# Patient Record
Sex: MALE | Race: WHITE | URBAN - METROPOLITAN AREA
[De-identification: names, ages, dates, MRNs, and addresses within clinical notes are randomized per-mention and may not be internally consistent; named-entity substitution may affect disease eponyms.]

---

## 2017-11-03 ENCOUNTER — OFFICE VISIT (OUTPATIENT)
Dept: ORTHOPEDIC SURGERY | Age: 52
End: 2017-11-03

## 2017-11-03 VITALS
DIASTOLIC BLOOD PRESSURE: 83 MMHG | HEART RATE: 78 BPM | WEIGHT: 195 LBS | HEIGHT: 70 IN | SYSTOLIC BLOOD PRESSURE: 128 MMHG | BODY MASS INDEX: 27.92 KG/M2

## 2017-11-03 DIAGNOSIS — S46.011A TRAUMATIC COMPLETE TEAR OF RIGHT ROTATOR CUFF, INITIAL ENCOUNTER: ICD-10-CM

## 2017-11-03 DIAGNOSIS — M25.511 ACUTE PAIN OF RIGHT SHOULDER: Primary | ICD-10-CM

## 2017-11-03 PROCEDURE — 99203 OFFICE O/P NEW LOW 30 MIN: CPT | Performed by: ORTHOPAEDIC SURGERY

## 2017-11-03 RX ORDER — ATORVASTATIN CALCIUM 20 MG/1
TABLET, FILM COATED ORAL
Refills: 11 | COMMUNITY
Start: 2017-08-21 | End: 2018-01-02

## 2017-11-03 ASSESSMENT — ENCOUNTER SYMPTOMS
GASTROINTESTINAL NEGATIVE: 1
BACK PAIN: 0
RESPIRATORY NEGATIVE: 1
EYES NEGATIVE: 1
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
## 2017-11-06 ENCOUNTER — OFFICE VISIT (OUTPATIENT)
Dept: ORTHOPEDIC SURGERY | Age: 52
End: 2017-11-06

## 2017-11-06 VITALS
RESPIRATION RATE: 12 BRPM | HEART RATE: 63 BPM | BODY MASS INDEX: 27.92 KG/M2 | DIASTOLIC BLOOD PRESSURE: 84 MMHG | HEIGHT: 70 IN | SYSTOLIC BLOOD PRESSURE: 129 MMHG | WEIGHT: 195 LBS

## 2017-11-06 DIAGNOSIS — S46.011A TRAUMATIC COMPLETE TEAR OF RIGHT ROTATOR CUFF, INITIAL ENCOUNTER: ICD-10-CM

## 2017-11-06 DIAGNOSIS — M25.511 ACUTE PAIN OF RIGHT SHOULDER: ICD-10-CM

## 2017-11-06 DIAGNOSIS — M75.21 BICEPS TENDINITIS OF RIGHT SHOULDER: Primary | ICD-10-CM

## 2017-11-06 PROCEDURE — L3670 SO ACRO/CLAV CAN WEB PRE OTS: HCPCS | Performed by: ORTHOPAEDIC SURGERY

## 2017-11-06 PROCEDURE — 99213 OFFICE O/P EST LOW 20 MIN: CPT | Performed by: ORTHOPAEDIC SURGERY

## 2017-11-06 NOTE — PROGRESS NOTES
Arleen Anne returns today to follow-up his right shoulder. He is feeling worse. He is significant anterior pain. He feels very limited and cannot move his right arm. He is very frustrated. Patient's medications, allergies, past medical, surgical, social and family histories were reviewed and updated as appropriate. Relevant review of systems reviewed. General Exam:    Vitals: Blood pressure 129/84, pulse 63, resp. rate 12, height 5' 10\" (1.778 m), weight 195 lb (88.5 kg). Constitutional: Patient is adequately groomed with no evidence of malnutrition  Mental Status: The patient is oriented to time, place and person. The patient's mood and affect are appropriate. Left shoulder exam remains benign. There is no tenderness over the biceps. He has no a.c. joint pain. He has full strength and normal motion. Right shoulder exam demonstrates significant inflammation and tenderness over the long head of the biceps. He has no a.c. joint pain. Strength in abduction is no better than 3/5. External rotation strength is 4/5. Internal rotation is 5 over 5. Neurologic and vascular exams of right upper extremity are normal.    MRI of the right shoulder is reviewed. It demonstrates:  1. High-grade partial-thickness interstitial and articular surface tear involves the mid to    distal supraspinatus and less so infraspinatus and minimally the distal subscapularis. A 6mm    region of full-thickness perforation is suggested at the anterodistal supraspinatus. Bursitis    is contributory. 2. AC arthrosis with undersurface spurring and type 2 acromion does result in lateral and    medial arch narrowing. Pattern predisposes to outlet-related cuff impingement. 3. Mild arcuate segment biceps long head tendinopathy. Tendon is perched on a hypertrophied    lesser tuberosity. Traction-related bony hypertrophy involves the lesser tuberosity. No jane    tear of the biceps or dislocation at this time.     4.

## 2017-11-09 ENCOUNTER — TELEPHONE (OUTPATIENT)
Dept: ORTHOPEDIC SURGERY | Age: 52
End: 2017-11-09

## 2017-11-16 ENCOUNTER — PAT TELEPHONE (OUTPATIENT)
Dept: PREADMISSION TESTING | Age: 52
End: 2017-11-16

## 2017-11-16 ENCOUNTER — TELEPHONE (OUTPATIENT)
Dept: ORTHOPEDIC SURGERY | Age: 52
End: 2017-11-16

## 2017-11-16 VITALS — HEIGHT: 70 IN | BODY MASS INDEX: 27.92 KG/M2 | WEIGHT: 195 LBS

## 2017-11-16 ASSESSMENT — PAIN - FUNCTIONAL ASSESSMENT: PAIN_FUNCTIONAL_ASSESSMENT: 0-10

## 2017-11-16 ASSESSMENT — PAIN SCALES - GENERAL: PAINLEVEL_OUTOF10: 3

## 2017-11-16 NOTE — TELEPHONE ENCOUNTER
Patient called to ask about taking Tylenol. Notified patient that he is ok to take Tylenol and can ice 20 minutes every hour PRN pain. Patient stated that he did not receive my voicemail from 12:52 today. Verified patient phone number on file is correct. Patient will check his voice mail messages.

## 2017-11-16 NOTE — PRE-PROCEDURE INSTRUCTIONS
on the day of surgery. All jewelry must be removed. If you have dentures, they will be removed before going to operating room. For your convenience, we will provide you with a container. If you wear contact lenses or glasses, they will be removed, please bring a case for them. If you have a living will and a durable power of  for healthcare, please bring in a copy. As part of our patient safety program to minimize surgical site infections, we ask you to do the following:    · Please notify your surgeon if you develop any illness between         now and the  day of your surgery. · This includes a cough, cold, fever, sore throat, nausea,         or vomiting, and diarrhea, etc.  ·  Please notify your surgeon if you experience dizziness, shortness         of breath or blurred vision between now and the time of your surgery. Do not shave your operative site 96 hours prior to surgery. For face and neck surgery, men may use an electric razor 48 hours   prior to surgery. You may shower the night before surgery or the morning of   your surgery with an antibacterial soap. You will need to bring a photo ID and insurance card    [facility] has an onsite pharmacy, would you like to utilize our pharmacy     If you will be staying overnight and use a C-pap machine, please bring   your C-pap to hospital     Our goal is to provide you with excellent care, therefore, visitors will be limited to two(2) in the room at a time so that we may focus on providing this care for you. Please contact pre-admission testing if you have any further questions. [facility] phone number:  [phone] PAT fax number:  [phone]  Please note these are generalized instructions for all surgical cases, you may be provided with more specific instructions according to your surgery.

## 2017-11-17 ENCOUNTER — SURG/PROC ORDERS (OUTPATIENT)
Dept: ANESTHESIOLOGY | Age: 52
End: 2017-11-17

## 2017-11-17 RX ORDER — SODIUM CHLORIDE 0.9 % (FLUSH) 0.9 %
10 SYRINGE (ML) INJECTION PRN
Status: CANCELLED | OUTPATIENT
Start: 2017-11-17

## 2017-11-17 RX ORDER — OXYCODONE HYDROCHLORIDE AND ACETAMINOPHEN 5; 325 MG/1; MG/1
1 TABLET ORAL EVERY 6 HOURS PRN
Qty: 28 TABLET | Refills: 0 | Status: SHIPPED | OUTPATIENT
Start: 2017-11-17 | End: 2018-01-02

## 2017-11-17 RX ORDER — DOCUSATE SODIUM 100 MG/1
100 CAPSULE, LIQUID FILLED ORAL 2 TIMES DAILY
Qty: 60 CAPSULE | Refills: 0 | Status: SHIPPED | OUTPATIENT
Start: 2017-11-17 | End: 2018-01-02

## 2017-11-17 RX ORDER — SODIUM CHLORIDE 0.9 % (FLUSH) 0.9 %
10 SYRINGE (ML) INJECTION EVERY 12 HOURS SCHEDULED
Status: CANCELLED | OUTPATIENT
Start: 2017-11-17

## 2017-11-17 RX ORDER — SODIUM CHLORIDE 9 MG/ML
INJECTION, SOLUTION INTRAVENOUS CONTINUOUS
Status: CANCELLED | OUTPATIENT
Start: 2017-11-17

## 2017-11-17 RX ORDER — PROMETHAZINE HYDROCHLORIDE 25 MG/1
25 TABLET ORAL EVERY 6 HOURS PRN
Qty: 30 TABLET | Refills: 0 | Status: SHIPPED | OUTPATIENT
Start: 2017-11-17 | End: 2017-11-24

## 2017-11-20 ENCOUNTER — HOSPITAL ENCOUNTER (OUTPATIENT)
Dept: SURGERY | Age: 52
Discharge: HOME | End: 2017-11-20
Attending: ORTHOPAEDIC SURGERY | Admitting: ORTHOPAEDIC SURGERY

## 2017-11-20 VITALS
WEIGHT: 195 LBS | DIASTOLIC BLOOD PRESSURE: 79 MMHG | HEART RATE: 92 BPM | RESPIRATION RATE: 16 BRPM | OXYGEN SATURATION: 93 % | HEIGHT: 70 IN | BODY MASS INDEX: 27.92 KG/M2 | SYSTOLIC BLOOD PRESSURE: 117 MMHG | TEMPERATURE: 97.4 F

## 2017-11-20 RX ORDER — SODIUM CHLORIDE 9 MG/ML
INJECTION, SOLUTION INTRAVENOUS CONTINUOUS
Status: DISCONTINUED | OUTPATIENT
Start: 2017-11-20 | End: 2017-11-21 | Stop reason: HOSPADM

## 2017-11-20 RX ORDER — SODIUM CHLORIDE 0.9 % (FLUSH) 0.9 %
10 SYRINGE (ML) INJECTION PRN
Status: DISCONTINUED | OUTPATIENT
Start: 2017-11-20 | End: 2017-11-21 | Stop reason: HOSPADM

## 2017-11-20 RX ORDER — FENTANYL CITRATE 50 UG/ML
25 INJECTION, SOLUTION INTRAMUSCULAR; INTRAVENOUS EVERY 5 MIN PRN
Status: DISCONTINUED | OUTPATIENT
Start: 2017-11-20 | End: 2017-11-21 | Stop reason: HOSPADM

## 2017-11-20 RX ORDER — OXYCODONE HYDROCHLORIDE AND ACETAMINOPHEN 5; 325 MG/1; MG/1
2 TABLET ORAL PRN
Status: ACTIVE | OUTPATIENT
Start: 2017-11-20 | End: 2017-11-20

## 2017-11-20 RX ORDER — OXYCODONE HYDROCHLORIDE AND ACETAMINOPHEN 5; 325 MG/1; MG/1
1 TABLET ORAL PRN
Status: ACTIVE | OUTPATIENT
Start: 2017-11-20 | End: 2017-11-20

## 2017-11-20 RX ORDER — APREPITANT 40 MG/1
40 CAPSULE ORAL ONCE
Status: COMPLETED | OUTPATIENT
Start: 2017-11-20 | End: 2017-11-20

## 2017-11-20 RX ORDER — SODIUM CHLORIDE 0.9 % (FLUSH) 0.9 %
10 SYRINGE (ML) INJECTION EVERY 12 HOURS SCHEDULED
Status: DISCONTINUED | OUTPATIENT
Start: 2017-11-20 | End: 2017-11-21 | Stop reason: HOSPADM

## 2017-11-20 RX ORDER — ONDANSETRON 2 MG/ML
4 INJECTION INTRAMUSCULAR; INTRAVENOUS
Status: COMPLETED | OUTPATIENT
Start: 2017-11-20 | End: 2017-11-20

## 2017-11-20 RX ADMIN — SODIUM CHLORIDE: 9 INJECTION, SOLUTION INTRAVENOUS at 06:45

## 2017-11-20 RX ADMIN — APREPITANT 40 MG: 40 CAPSULE ORAL at 07:00

## 2017-11-20 RX ADMIN — ONDANSETRON 4 MG: 2 INJECTION INTRAMUSCULAR; INTRAVENOUS at 10:05

## 2017-11-20 ASSESSMENT — PAIN DESCRIPTION - DESCRIPTORS: DESCRIPTORS: ACHING

## 2017-11-20 ASSESSMENT — LIFESTYLE VARIABLES: SMOKING_STATUS: 0

## 2017-11-20 ASSESSMENT — PAIN DESCRIPTION - PAIN TYPE: TYPE: PHANTOM PAIN

## 2017-11-20 ASSESSMENT — PAIN SCALES - GENERAL: PAINLEVEL_OUTOF10: 0

## 2017-11-20 ASSESSMENT — PAIN - FUNCTIONAL ASSESSMENT: PAIN_FUNCTIONAL_ASSESSMENT: 0-10

## 2017-11-20 ASSESSMENT — ENCOUNTER SYMPTOMS: SHORTNESS OF BREATH: 0

## 2017-11-20 NOTE — ANESTHESIA PRE-OP
[facility] Department of Anesthesiology  Pre-Anesthesia Evaluation/Consultation       Name:  Denny Brunner  : 1965  Age:  45 y. o. MRN:  [id number]  Date: 2017           Procedure (Scheduled):  Right shoulder scope, rotator cuff repair, biceps tenodesis  Surgeon:  Dr. Per Conroy      No Known Allergies  There is no problem list on file for this patient. Past Medical History:   Diagnosis Date    High cholesterol     PONV (postoperative nausea and vomiting)      Past Surgical History:   Procedure Laterality Date    EYE SURGERY Bilateral     LASIK    HERNIA REPAIR      lower abdominal area     Social History   Substance Use Topics    Smoking status: Never Smoker    Smokeless tobacco: Never Used    Alcohol use Yes      Comment: social     Medications  Current Outpatient Prescriptions on File Prior to Encounter   Medication Sig Dispense Refill    oxyCODONE-acetaminophen (PERCOCET) 5-325 MG per tablet Take 1 tablet by mouth every 6 hours as needed for Pain . 28 tablet 0    promethazine (PHENERGAN) 25 MG tablet Take 1 tablet by mouth every 6 hours as needed for Nausea 30 tablet 0    docusate sodium (COLACE) 100 MG capsule Take 1 capsule by mouth 2 times daily 60 capsule 0    atorvastatin (LIPITOR) 20 MG tablet TAKE 1 TABLET BY MOUTH ONE TIME A DAY  11    Cetirizine HCl (ZYRTEC PO) Take 10 mg by mouth daily       IBUPROFEN PO Take 600 mg by mouth daily as needed        No current facility-administered medications on file prior to encounter. Current Outpatient Prescriptions   Medication Sig Dispense Refill    oxyCODONE-acetaminophen (PERCOCET) 5-325 MG per tablet Take 1 tablet by mouth every 6 hours as needed for Pain .  28 tablet 0    promethazine (PHENERGAN) 25 MG tablet Take 1 tablet by mouth every 6 hours as needed for Nausea 30 tablet 0    docusate sodium (COLACE) 100 MG capsule Take 1 capsule by mouth 2 times daily 60 capsule 0    kg/m² as calculated from the following:    Height as of 11/16/17: 5' 10\" (1.778 m). Weight as of 11/16/17: 195 lb (88.5 kg). CBC No results found for: WBC, RBC, HGB, HCT, MCV, RDW, PLT  CMP  No results found for: NA, K, CL, CO2, BUN, CREATININE, GFRAA, AGRATIO, LABGLOM, GLUCOSE, PROT, CALCIUM, BILITOT, ALKPHOS, AST, ALT  BMP  No results found for: NA, K, CL, CO2, BUN, CREATININE, CALCIUM, GFRAA, LABGLOM, GLUCOSE  POCGlucose  No results for input(s): GLUCOSE in the last 72 hours. Coags  No results found for: PROTIME, INR, APTT  HCG (If Applicable) No results found for: PREGTESTUR, PREGSERUM, HCG, HCGQUANT   ABGs No results found for: PHART, PO2ART, FQD1UAB, VZL7RPK, BEART, G9OQJEEJ   Type & Screen (If Applicable)  No results found for: LABABO, LABRH                         BMI: Wt Readings from Last 3 Encounters:       NPO Status:     >8hrs                       Anesthesia Evaluation  Patient summary reviewed   history of anesthetic complications: PONV.   Airway: Mallampati: II  TM distance: >3 FB   Neck ROM: full  Mouth opening: > = 3 FB Dental:      Comment: No loose teeth    Pulmonary: breath sounds clear to auscultation      (-) COPD, asthma, shortness of breath, recent URI, sleep apnea (snores) and not a current smoker                           Cardiovascular:    (+) hyperlipidemia    (-) hypertension, valvular problems/murmurs, past MI, CAD, CABG/stent, dysrhythmias,  angina,  CHF, orthopnea and murmur      Rhythm: regular  Rate: normal           Beta Blocker:  Not on Beta Blocker         Neuro/Psych:      (-) seizures, neuromuscular disease, TIA, CVA, headaches and psychiatric history           GI/Hepatic/Renal:        (-) GERD, PUD, hepatitis, liver disease and bowel prep       Endo/Other:    (+) electrolyte abnormalities, .    (-) hypothyroidism, hyperthyroidism, blood dyscrasia, arthritis, no Type II DM, no Type I DM               Abdominal:           Vascular: Anesthesia Plan      general     ASA 2     (Right interscalene block. R/B/A discussed, shows understanding and wishes to proceed)  Induction: intravenous. MIPS: Prophylactic antiemetics administered. Anesthetic plan and risks discussed with patient and spouse. Plan discussed with CRNA. This pre-anesthesia assessment may be used as a history and physical.    DOS STAFF ADDENDUM:    Pt seen and examined, chart reviewed (including anesthesia, drug and allergy history). No interval changes to history and physical examination. Anesthetic plan, risks, benefits, alternatives, and personnel involved discussed with patient. Patient verbalized an understanding and agrees to proceed.       Nirali Benitez MD  November 20, 2017  6:46 AM

## 2017-11-20 NOTE — PROGRESS NOTES
Sleeping quietly in bed. SaO2 89-9% at times when asleep. Patient encouraged to cough and deep breathe. Patient repositioned up in bed. Right shoulder dressing dry and intact. Ice pack and sling in place. Patient denies C/O pain.

## 2017-11-20 NOTE — PROGRESS NOTES
Patient sleeping quietly. Arouses easily to name. VSS. IV patent. Patient denies C/O pain or nausea. Right shoulder dressing dry and intact with neurovascular checks stable to RUE.

## 2017-11-20 NOTE — H&P
Pt seen and examined, no interval changes, right shoulder marked, all questions answered, ready for surgery.

## 2017-11-20 NOTE — PROGRESS NOTES
Patient sleeping quietly. Arouses to name, drowsy. Patient denies C/O pain or nausea. VSS. IV patent. Right shoulder dressings dry and intact with stable neurovascular checks to RUE.

## 2017-11-20 NOTE — PROGRESS NOTES
Patient arouses to name, oral air way removed. VSS. IV patent. Resp easy unlabored. Right shoulder dressing unchanged. Neurovascular checks stable to RUE. Patient able to move fingers minimally. Patient C/O numbness to right arm.

## 2017-11-20 NOTE — PROGRESS NOTES
Patient C/O mild nausea and medicated with Zofran 4 mg IV per order. Family updated in [place] [facility] VSS. IV patent. Right shoulder dressings unchanged.

## 2017-11-21 ENCOUNTER — OFFICE VISIT (OUTPATIENT)
Dept: ORTHOPEDIC SURGERY | Age: 52
End: 2017-11-21

## 2017-11-21 ENCOUNTER — HOSPITAL ENCOUNTER (OUTPATIENT)
Dept: OTHER | Age: 52
Discharge: HOME | End: 2017-11-30
Attending: ORTHOPAEDIC SURGERY | Admitting: ORTHOPAEDIC SURGERY

## 2017-11-21 VITALS — BODY MASS INDEX: 27.92 KG/M2 | WEIGHT: 195 LBS | HEIGHT: 70 IN

## 2017-11-21 DIAGNOSIS — M75.21 BICEPS TENDINITIS OF RIGHT SHOULDER: Primary | ICD-10-CM

## 2017-11-21 DIAGNOSIS — S46.011D TRAUMATIC COMPLETE TEAR OF RIGHT ROTATOR CUFF, SUBSEQUENT ENCOUNTER: ICD-10-CM

## 2017-11-21 DIAGNOSIS — M25.511 ACUTE PAIN OF RIGHT SHOULDER: ICD-10-CM

## 2017-11-21 PROCEDURE — 99024 POSTOP FOLLOW-UP VISIT: CPT | Performed by: ORTHOPAEDIC SURGERY

## 2017-11-21 NOTE — PLAN OF CARE
[address], [address] [place], [address]  Phone: [phone]   Fax: [phone]          Physical Therapy Certification    Dear Referring Practitioner: Roxy Gibson,    We had the pleasure of evaluating the following patient for physical therapy services at [address] [place] [address]. A summary of our findings can be found in the initial assessment below. This includes our plan of care. If you have any questions or concerns regarding these findings, please do not hesitate to contact me at the office phone number checked above. Thank you for the referral.       Physician Signature:_______________________________Date:__________________  By signing above (or electronic signature), therapists plan is approved by physician      Patient: Samra Flood   : 1965   MRN: [id number]  Referring Physician: Referring Practitioner: David      Evaluation Date: 2017      Medical Diagnosis Information:  Diagnosis: B61.034 (ICD-10-CM) - Traumatic complete tear of right rotator cuff; M25.511 (ICD-10-CM) - Acute pain of right shoulder;  s/p right RCR with open biceps tenodesis. Surgery on 17   Treatment Diagnosis: M25.511 (ICD-10-CM) - Acute pain of right shoulder                                           Precautions/ Contra-indications: post op protocol  Latex Allergy:  [x]NO      []YES  Preferred Language for Healthcare:   [x]English       []other:    SUBJECTIVE: Patient stated complaint:  Pt fell around the last weekend of October out of a plastic lawn chair and landed on his shoulder on a golf cart. He knew something was wrong. He went to an ortho urgent care, but he did come see Dr. Roxy Gibson the Monday after the injury. Pt is RHD. Relevant Medical History:hernia surgery  Functional Disability Index:  UEFI    Pain Scale: 3-4/10  Easing factors: one pain pill at 9 PM last night and one at 0600 this Am.   He did eat this morning. Nerve block helped. Provocative factors:  NA surgery was yesterday    Type: []Constant   []Intermittent  []Radiating []Localized []other:     Numbness/Tingling: none    Functional Limitations/Impairments: [x]Lifting/reaching [x]Grooming [x]Carrying    [x]ADL's [x]Driving [x]Sports/Recreations   [x]Other:    Occupation/School: . He is able to do light duty work. He is planning on taking 2-3 weeks of work completely, and then returning to light duty. Living Status/Prior Level of Function: Independent with ADLs and IADLs, he is in a [employer] league (2x/wk), fishing, golfing, cars. (insert highest prior level of function)    OBJECTIVE:   Temp 97.8 F    CERV ROM     Cervical Flexion     Cervical Extension     Cervical SB     Cervical rotation         ROM PROM AROM  Comment    L R L R    Flexion   154 seated     Abduction   164 seated     ER        IR        Other        Other             Strength L R Comment   Flexion      Abduction      ER      IR      Supraspinatus      Upper Trap      Lower Trap      Mid Trap      Rhomboids      Biceps      Triceps      Horizontal Abduction      Horizontal Adduction      Lats        Special Tests Left Right   Apley Scratch IR:  ER:   Cross body: IR:  ER:  Cross Body:   Neer's     Full Can     Empty Can     Jemima Ochoa     Nerve Tension Testing     Speed's     Magdaleno's      Spurling's     Repeated Scaption                Reflexes/Sensation (myotomes/dermatomes): sensation grossly intact    Joint mobility:   []Normal    [x]Hypo   []Hyper    Palpation: NT secondary to post op    Functional Mobility/Transfers: limited secondary to post op    Posture: slight forward head    Bandages/Dressings/Incisions: pt's dressings were removed by PT. MD did replace steristrips. No S&S of infection were noted. Tegaderm was replaced over the new steristrips.        Gait:  [facility]    Orthopedic Special Tests: NT secondary to post op                       [x] Patient history, allergies, meds reviewed. Medical chart reviewed. See intake form. Review Of Systems (ROS):  [x]Performed Review of systems (Integumentary, CardioPulmonary, Neurological) by intake and observation. Intake form has been scanned into medical record. Patient has been instructed to contact their primary care physician regarding ROS issues if not already being addressed at this time. Co-morbidities/Complexities (which will affect course of rehabilitation):   [x]None           Arthritic conditions   []Rheumatoid arthritis (M05.9)  []Osteoarthritis (M19.91)   Cardiovascular conditions   []Hypertension (I10)  []Hyperlipidemia (E78.5)  []Angina pectoris (I20)  []Atherosclerosis (I70)   Musculoskeletal conditions   []Disc pathology   []Congenital spine pathologies   []Prior surgical intervention  []Osteoporosis (M81.8)  []Osteopenia (M85.8)   Endocrine conditions   []Hypothyroid (E03.9)  []Hyperthyroid Gastrointestinal conditions   []Constipation (N80.75)   Metabolic conditions   []Morbid obesity (E66.01)  []Diabetes type 1(E10.65) or 2 (E11.65)   []Neuropathy (G60.9)     Pulmonary conditions   []Asthma (J45)  []Coughing   []COPD (J44.9)   Psychological Disorders  []Anxiety (F41.9)  []Depression (F32.9)   []Other:   []Other:          Barriers to/and or personal factors that will affect rehab potential:              [x]Age  []Sex              []Motivation/Lack of Motivation                        []Co-Morbidities              []Cognitive Function, education/learning barriers              []Environmental, home barriers              []profession/work barriers  []past PT/medical experience  []other:  Justification:      Falls Risk Assessment (30 days):   [x] Falls Risk assessed and no intervention required.   [] Falls Risk assessed and Patient requires intervention due to being higher risk   TUG score (>12s at risk):     [] Falls education provided, including  Pt did fall but it was out of a chair, not with walking. G-Codes:  PT G-Codes  Functional Assessment Tool Used: UEFI  Score: 3.75%  Functional Limitation: Carrying, moving and handling objects  Carrying, Moving and Handling Objects Current Status (): At least 80 percent but less than 100 percent impaired, limited or restricted  Carrying, Moving and Handling Objects Goal Status (): 0 percent impaired, limited or restricted    ASSESSMENT:   Functional Impairments   [x]Noted spinal or UE joint hypomobility   []Noted spinal or UE joint hypermobility   []Decreased UE functional ROM   [x]Decreased UE functional strength   []Abnormal reflexes/sensation/myotomal/dermatomal deficits   [x]Decreased RC/scapular/core strength and neuromuscular control   []other:      Functional Activity Limitations (from functional questionnaire and intake)   [x]Reduced ability to tolerate prolonged functional positions   [x]Reduced ability or difficulty with changes of positions or transfers between positions   []Reduced ability to maintain good posture and demonstrate good body mechanics with sitting, bending, and lifting   [x] Reduced ability or tolerance with driving and/or computer work   [x]Reduced ability to sleep   [x]Reduced ability to perform lifting, reaching, carrying tasks   [x]Reduced ability to tolerate impact through UE   [x]Reduced ability to reach behind back   [x]Reduced ability to  or hold objects   [x]Reduced ability to throw or toss an object   []other:    Participation Restrictions   [x]Reduced participation in self care activities   [x]Reduced participation in home management activities   [x]Reduced participation in work activities   [x]Reduced participation in social activities. [x]Reduced participation in sport/recreation activities. Classification:   []Signs/symptoms consistent with post-surgical status including decreased ROM, strength and function.   []Signs/symptoms consistent with joint sprain/strain   []Signs/symptoms consistent with shoulder impingement   []Signs/symptoms consistent with shoulder/elbow/wrist tendinopathy   []Signs/symptoms consistent with Rotator cuff tear   []Signs/symptoms consistent with labral tear   []Signs/symptoms consistent with postural dysfunction    []Signs/symptoms consistent with Glenohumeral IR Deficit - <45 degrees   []Signs/symptoms consistent with facet dysfunction of cervical/thoracic spine    []Signs/symptoms consistent with pathology which may benefit from Dry needling     [x]other:  Pt is a 44 y/o male presenting with diagnosis of s/p right RCR and biceps tenodesis from the MD.  Clinically, the pt presents with decreased ROM, decreased strength, decreased function, and increased pain consistent with the MD diagnosis. The pt would benefit from skilled PT to return to [facility]. We did discuss that the pt does live close to the [place] office, but he will plan on attending PT here for the first 6 weeks. We discussed his protocol and what to expect with it. We did also discuss the [facility] program.    Prognosis/Rehab Potential:      []Excellent   [x]Good    []Fair   []Poor    Tolerance of evaluation/treatment:    []Excellent   [x]Good    []Fair   []Poor    PLAN:  Frequency/Duration:  1-2 days per week for 24 Weeks:  INTERVENTIONS:  [x] Therapeutic exercise including: strength training, ROM, for Upper extremity and core   [x]  NMR activation and proprioception for UE, scap and Core   [x] Manual therapy as indicated for shoulder, scapula and spine to include: Dry Needling/IASTM, STM, PROM, Gr I-IV mobilizations, manipulation. [x] Modalities as needed that may include: thermal agents, E-stim, Biofeedback, US, iontophoresis as indicated  [x] Patient education on joint protection, postural re-education, activity modification, progression of HEP. HEP instruction: (see scanned forms)    GOALS:  Patient stated goal: return to work and sports. Therapist goals for Patient:   Short Term Goals:  To be achieved in: 2 weeks  1. Pt will be independent in HEP and progression per patient tolerance, in order to prevent re-injury. 2. Patient will report pain at worst less than or equal to 4/10  to facilitate improvement in movement, function, and ADLs as indicated by dunctional seficits. Long Term Goals: To be achieved in: 24 weeks  1. Pt will demo a UEFI of greater than or equal to 85% to assist with reaching prior level of function. 2. Patient will demonstrate increased AROM shoulder flexion greater than or equal to 150, ABD greater than or equal to 160, IR greater than or equal 50, ER greater than or equal to 80 to allow for proper joint functioning as indicated by patient's functional deficits. 3. Patient will demonstrate an increase in strength to 4 to 4+ to allow for proper functional mobility as indicated by patient's functional deficits. 4. Patient will return to work without increased symptoms or restriction. 5. Pt will return to bowling without increasing pain or symptoms. 6.  Pt will report pain at worst less than or equal to 2/10.       Physical Therapy Evaluation Complexity Justification  [x] A history of present problem with:  [x] no personal factors and/or comorbidities that impact the plan of care;  []1-2 personal factors and/or comorbidities that impact the plan of care  []3 personal factors and/or comorbidities that impact the plan of care  [x] An examination of body systems using standardized tests and measures addressing any of the following: body structures and functions (impairments), activity limitations, and/or participation restrictions;:  [] a total of 1-2 or more elements   [] a total of 3 or more elements   [x] a total of 4 or more elements   [x] A clinical presentation with:  [x] stable and/or uncomplicated characteristics   [] evolving clinical presentation with changing characteristics  [] unstable and unpredictable characteristics;   [x] Clinical decision making of [x] low, [] moderate, [] high complexity using standardized patient assessment instrument and/or measurable assessment of functional outcome.     [x] EVAL (LOW) 02882 (typically 20 minutes face-to-face)  [] EVAL (MOD) 66333 (typically 30 minutes face-to-face)  [] EVAL (HIGH) 95415 (typically 45 minutes face-to-face)  [] Litzy Slicker        Electronically signed by:  Oneil Zapata, PT, DPT [id number]

## 2017-11-21 NOTE — FLOWSHEET NOTE
Orthopaedics and [address] [place] [address] DR JUAN CULP      Physical Therapy Daily Treatment Note  Date:  2017    Patient Name:  Sharita Christopher  \"Zia\"  :  1965  MRN: [id number]  Medical/Treatment Diagnosis Information:  · Diagnosis: O49.452 (ICD-10-CM) - Traumatic complete tear of right rotator cuff; M25.511 (ICD-10-CM) - Acute pain of right shoulder;  s/p right RCR with open biceps tenodesis. Surgery on 17  · Treatment Diagnosis: M25.511 (ICD-10-CM) - Acute pain of right shoulder  Insurance/Certification information:  PT Insurance Information: [place]  Physician Information:  Referring Practitioner: [facility] of care signed (Y/N):     Date of Patient follow up with Physician: 2017    G-Code (if applicable):      Date G-Code Applied:  2017  PT G-Codes  Functional Assessment Tool Used: UEFI  Score: 3.75%  Functional Limitation: Carrying, moving and handling objects  Carrying, Moving and Handling Objects Current Status (): At least 80 percent but less than 100 percent impaired, limited or restricted  Carrying, Moving and Handling Objects Goal Status (): 0 percent impaired, limited or restricted    Progress Note: [x]  Yes  []  No  Next due by: Visit #10  Or 19 Dec 2017    Latex Allergy:  [x]NO      []YES  Preferred Language for Healthcare:   [x]English       []other:    Visit # Insurance Allowable   1 20     Pain level:  See eval     SUBJECTIVE:  See eval    OBJECTIVE: See eval  Observation:   Test measurements:      ROM PROM AROM  Comment    L R L R    Flexion        Abduction        ER        IR        Other        Other             Strength L R Comment   Flexion      Abduction      ER      IR      Supraspinatus      Upper Trap      Lower Trap      Mid Trap      Rhomboids      Biceps      Triceps      Horizontal Abduction      Horizontal Adduction      Lats          RESTRICTIONS/PRECAUTIONS: NWB, no resisted biceps for 4 weeks, 6 weeks in sling.   PROM limited to 100 diagnosis. The pt would benefit from skilled PT to return to [facility]. We did discuss that the pt does live close to the [facility] office, but he will plan on attending PT here for the first 6 weeks. We discussed his protocol and what to expect with it.   We did also discuss the [facility] program.    Prognosis: [] Good [] Fair  [] Poor    Patient Requires Follow-up: [x] Yes  [] No    PLAN: See eval  [] Continue per plan of care [] Alter current plan (see comments)  [x] Plan of care initiated [] Hold pending MD visit [] Discharge    Electronically signed by: Gonzalo Gonzáles DPT [id number]

## 2017-11-28 ENCOUNTER — OFFICE VISIT (OUTPATIENT)
Dept: ORTHOPEDIC SURGERY | Age: 52
End: 2017-11-28

## 2017-11-28 ENCOUNTER — HOSPITAL ENCOUNTER (OUTPATIENT)
Dept: PHYSICAL THERAPY | Age: 52
Discharge: HOME | End: 2017-11-29
Admitting: ORTHOPAEDIC SURGERY

## 2017-11-28 VITALS — BODY MASS INDEX: 27.92 KG/M2 | WEIGHT: 195 LBS | HEIGHT: 70 IN

## 2017-11-28 DIAGNOSIS — M25.511 ACUTE PAIN OF RIGHT SHOULDER: ICD-10-CM

## 2017-11-28 DIAGNOSIS — M75.21 BICEPS TENDINITIS OF RIGHT SHOULDER: Primary | ICD-10-CM

## 2017-11-28 DIAGNOSIS — S46.011D TRAUMATIC COMPLETE TEAR OF RIGHT ROTATOR CUFF, SUBSEQUENT ENCOUNTER: ICD-10-CM

## 2017-11-28 PROCEDURE — 99024 POSTOP FOLLOW-UP VISIT: CPT | Performed by: ORTHOPAEDIC SURGERY

## 2017-12-01 ENCOUNTER — HOSPITAL ENCOUNTER (OUTPATIENT)
Dept: OTHER | Age: 52
Discharge: HOME | End: 2017-12-31
Attending: ORTHOPAEDIC SURGERY | Admitting: ORTHOPAEDIC SURGERY

## 2017-12-04 ENCOUNTER — TELEPHONE (OUTPATIENT)
Dept: ORTHOPEDIC SURGERY | Age: 52
End: 2017-12-04

## 2017-12-05 ENCOUNTER — HOSPITAL ENCOUNTER (OUTPATIENT)
Dept: PHYSICAL THERAPY | Age: 52
Discharge: HOME | End: 2017-12-06
Admitting: ORTHOPAEDIC SURGERY

## 2017-12-12 ENCOUNTER — HOSPITAL ENCOUNTER (OUTPATIENT)
Dept: PHYSICAL THERAPY | Age: 52
Discharge: HOME | End: 2017-12-13
Admitting: ORTHOPAEDIC SURGERY

## 2017-12-19 ENCOUNTER — HOSPITAL ENCOUNTER (OUTPATIENT)
Dept: PHYSICAL THERAPY | Age: 52
Discharge: HOME | End: 2017-12-20
Admitting: ORTHOPAEDIC SURGERY

## 2017-12-27 ENCOUNTER — HOSPITAL ENCOUNTER (OUTPATIENT)
Dept: PHYSICAL THERAPY | Age: 52
Discharge: HOME | End: 2017-12-28
Admitting: ORTHOPAEDIC SURGERY

## 2018-01-01 ENCOUNTER — HOSPITAL ENCOUNTER (OUTPATIENT)
Dept: OTHER | Age: 53
Discharge: HOME | End: 2018-01-31
Attending: ORTHOPAEDIC SURGERY | Admitting: ORTHOPAEDIC SURGERY

## 2018-01-02 ENCOUNTER — HOSPITAL ENCOUNTER (OUTPATIENT)
Dept: PHYSICAL THERAPY | Age: 53
Discharge: HOME | End: 2018-01-03
Admitting: ORTHOPAEDIC SURGERY

## 2018-01-02 ENCOUNTER — OFFICE VISIT (OUTPATIENT)
Dept: ORTHOPEDIC SURGERY | Age: 53
End: 2018-01-02

## 2018-01-02 VITALS — WEIGHT: 195 LBS | BODY MASS INDEX: 27.92 KG/M2 | HEIGHT: 70 IN

## 2018-01-02 DIAGNOSIS — M75.21 BICEPS TENDINITIS OF RIGHT SHOULDER: Primary | ICD-10-CM

## 2018-01-02 DIAGNOSIS — S46.011D TRAUMATIC COMPLETE TEAR OF RIGHT ROTATOR CUFF, SUBSEQUENT ENCOUNTER: ICD-10-CM

## 2018-01-02 PROCEDURE — 99024 POSTOP FOLLOW-UP VISIT: CPT | Performed by: ORTHOPAEDIC SURGERY

## 2018-01-02 RX ORDER — ATORVASTATIN CALCIUM 40 MG/1
40 TABLET, FILM COATED ORAL
COMMUNITY
Start: 2017-11-08

## 2018-01-02 NOTE — LETTER
[address] [place] [address]  Phone: [phone]  Fax: [phone]    Keila Doan MD  January 10, 2018     [address] [address]    Patient: Isabel Ramírez  MR Number: [id number]  YOB: 1965  Date of Visit: 1/2/2018    Dear Dr. Endy Grullon are the relevant portions of my assessment and plan of care. Isabel Ramírez returns today status post right shoulder arthroscopy with rotator cuff repair and biceps tenodesis. Surgery was November 20. He is doing well. Pain is at worst about 110. Overall he is pleased with his progress. He'll come out of the sling today. On exam today he has 4/5 strength in abduction and external rotation. He has tightness in elevation beyond 90°. He has tightness in external rotation beyond 30°. He has no tenderness. Neurologic and vascular exams the right upper extremity is normal.  Incisions are well-healed. Overall he looks good. He can discontinue the sling. He'll progress to full motion and therapy over the next 6 weeks. Follow-up with me in 6 weeks. This note was created using voice recognition software. It has been proofread, but occasionally errors remain. Please disregard these errors. They will be corrected as they are noted. If you have questions, please do not hesitate to call me. I look forward to following [facility] along with you.     Sincerely,    Keila Doan MD

## 2018-01-02 NOTE — FLOWSHEET NOTE
NWB, no resisted biceps for 4 weeks, 6 weeks in sling. PROM limited to 100 flexion and 60 ER for first 6 weeks. Exercises/Interventions:   Exercise/Equipment Resistance/Repetitions Other comments   Aerobic Conditioning     Aerodyne          Stretching/PROM     Wand 10x:10 ER supine At 0 and 45 ABD   Table Slides 10x:10 flex   Wall slides      UE [place]     Pulleys 10x:10 Flex/scap   Pendulum hangs 10x:10    BB IR 10x:10 cane   SL IR     Pec doorway stretch     CBA stretch     UT stretch LS stretch Elbow flex/ext     Wrist flex/ext               Isometrics     Retraction          Weight shift     Flexion     Abduction     External Rotation     Internal Rotation     Biceps     Triceps          PRE's     Flexion     Abduction     External Rotation     Internal Rotation     Shrugs 3x10 3#    EXT     Reverse Flys     Serratus     Horizontal Abd with ER     Biceps 3x10 2#    Triceps     Retraction     Wrist flex/ext               Cable Column/Theraband     External Rotation     Internal Rotation     Shrugs     Lats     Ext 3x10 red    Flex     Scapular Retraction 3x10 red    BIC     TRIC     PNF          Dynamic Stability          [place]              Therapeutic Exercise and NMR EXR  [x] (18079) Provided verbal/tactile cueing for activities related to strengthening, flexibility, endurance, ROM  for improvements in scapular, scapulothoracic and UE control with self care, reaching, carrying, lifting, house/yardwork, driving/computer work.    [] (12618) Provided verbal/tactile cueing for activities related to improving balance, coordination, kinesthetic sense, posture, motor skill, proprioception  to assist with  scapular, scapulothoracic and UE control with self care, reaching, carrying, lifting, house/yardwork, driving/computer work.     Therapeutic Activities:    [] (01639 or 75348) Provided verbal/tactile cueing for activities related to improving balance, coordination, kinesthetic sense, posture, motor skill, proprioception and motor activation to allow for proper function of scapular, scapulothoracic and UE control with self care, carrying, lifting, driving/computer work. Home Exercise Program:    [x] (72371) Reviewed/Progressed HEP activities related to strengthening, flexibility, endurance, ROM of scapular, scapulothoracic and UE control with self care, reaching, carrying, lifting, house/yardwork, driving/computer work  [] (18390) Reviewed/Progressed HEP activities related to improving balance, coordination, kinesthetic sense, posture, motor skill, proprioception of scapular, scapulothoracic and UE control with self care, reaching, carrying, lifting, house/yardwork, driving/computer work      Manual Treatments:  PROM / STM / Oscillations-Mobs:  G-I, II, III, IV (PA's, Inf., Post.)  [] (68902) Provided manual therapy to mobilize soft tissue/joints of cervical/CT, scapular GHJ and UE for the purpose of modulating pain, promoting relaxation,  increasing ROM, reducing/eliminating soft tissue swelling/inflammation/restriction, improving soft tissue extensibility and allowing for proper ROM for normal function with self care, reaching, carrying, lifting, house/yardwork, driving/computer work    Pt Education:     Modalities: 15' ice     Charges:   Timed Code Treatment Minutes: 40'   Total Treatment Minutes: 80'     [] EVAL (LOW) 66663   [] EVAL (MOD) 08195   [] EVAL (HIGH) 96272   [] RE-EVAL   [x] KL(85479) x  1   [] IONTO  [] NMR (90388) x      [] VASO  [] Manual (04058) x       [] Other:  [x] TA x  2    [] [facility] (23502)  [] ES(attended) (79099)      [] ES (un) (83521): Therapist goals for Patient:   Short Term Goals: To be achieved in: 2 weeks  1. Pt will be independent in HEP and progression per patient tolerance, in order to prevent re-injury. -met  2.  Patient will report pain at worst less than or equal to 4/10  to facilitate improvement in movement, function, and ADLs as indicated by dunctional

## 2018-01-02 NOTE — PROGRESS NOTES
Sunitha Chapman returns today status post right shoulder arthroscopy with rotator cuff repair and biceps tenodesis. Surgery was November 20. He is doing well. Pain is at worst about 110. Overall he is pleased with his progress. He'll come out of the sling today. On exam today he has 4/5 strength in abduction and external rotation. He has tightness in elevation beyond 90°. He has tightness in external rotation beyond 30°. He has no tenderness. Neurologic and vascular exams the right upper extremity is normal.  Incisions are well-healed. Overall he looks good. He can discontinue the sling. He'll progress to full motion and therapy over the next 6 weeks. Follow-up with me in 6 weeks. This note was created using voice recognition software. It has been proofread, but occasionally errors remain. Please disregard these errors. They will be corrected as they are noted.

## 2018-01-09 ENCOUNTER — HOSPITAL ENCOUNTER (OUTPATIENT)
Dept: PHYSICAL THERAPY | Age: 53
Discharge: HOME | End: 2018-01-10
Admitting: ORTHOPAEDIC SURGERY

## 2018-01-09 NOTE — FLOWSHEET NOTE
work.    Therapeutic Activities:    [] (02676 or 89939) Provided verbal/tactile cueing for activities related to improving balance, coordination, kinesthetic sense, posture, motor skill, proprioception and motor activation to allow for proper function of scapular, scapulothoracic and UE control with self care, carrying, lifting, driving/computer work. Home Exercise Program:    [x] (44466) Reviewed/Progressed HEP activities related to strengthening, flexibility, endurance, ROM of scapular, scapulothoracic and UE control with self care, reaching, carrying, lifting, house/yardwork, driving/computer work  [] (30673) Reviewed/Progressed HEP activities related to improving balance, coordination, kinesthetic sense, posture, motor skill, proprioception of scapular, scapulothoracic and UE control with self care, reaching, carrying, lifting, house/yardwork, driving/computer work      Manual Treatments:  PROM / STM / Oscillations-Mobs:  G-I, II, III, IV (PA's, Inf., Post.)  [x] (56306) Provided manual therapy to mobilize soft tissue/joints of cervical/CT, scapular GHJ and UE for the purpose of modulating pain, promoting relaxation,  increasing ROM, reducing/eliminating soft tissue swelling/inflammation/restriction, improving soft tissue extensibility and allowing for proper ROM for normal function with self care, reaching, carrying, lifting, house/yardwork, driving/computer work   15'- gentle flex, ABD, IR, ER    Pt Education:     Modalities: 15' ice     Charges:   Timed Code Treatment Minutes: 60'   Total Treatment Minutes: 76'     [] EVAL (LOW) 61721   [] EVAL (MOD) 83606   [] EVAL (HIGH) 29463   [] RE-EVAL   [x] UG(14921) x  1   [] IONTO  [] NMR (69759) x      [] VASO  [x] Manual (32521) x  1    [] Other:  [x] TA x  2    [] Mech Traction (40422)  [] ES(attended) (63994)      [] ES (un) (32849): Therapist goals for Patient:   Short Term Goals: To be achieved in: 2 weeks  1.  Pt will be independent in HEP and progressing motion.   [x] Continue per plan of care [] Alter current plan (see comments)  [] Plan of care initiated [] Hold pending MD visit [] Discharge    Electronically signed by: Сергей Villa DPT [id number]

## 2018-01-16 ENCOUNTER — HOSPITAL ENCOUNTER (OUTPATIENT)
Dept: PHYSICAL THERAPY | Age: 53
Discharge: HOME | End: 2018-01-17
Admitting: ORTHOPAEDIC SURGERY

## 2018-01-16 RX ORDER — METHYLPREDNISOLONE 4 MG/1
TABLET ORAL
Qty: 1 KIT | Refills: 0 | Status: SHIPPED | OUTPATIENT
Start: 2018-01-16 | End: 2018-04-26

## 2018-01-16 NOTE — PLAN OF CARE
- Traumatic complete tear of right rotator cuff; M25.511 (ICD-10-CM) - Acute pain of right shoulder;  s/p right RCR with open biceps tenodesis. Surgery on 11/20/17  · Treatment Diagnosis: M25.511 (ICD-10-CM) - Acute pain of right shoulder  Insurance/Certification information:  PT Insurance Information: [employer]  Physician Information:  Referring Practitioner: Vicente Payment of care signed (Y/N):     Date of Patient follow up with Physician: 13 Feb 2018    G-Code (if applicable):      Date G-Code Applied:  1/16/18   PT G-Codes  Functional Assessment Tool Used: UEFI  Score: 63.75%  Functional Limitation: Carrying, moving and handling objects  Carrying, Moving and Handling Objects Current Status (): At least 40 percent but less than 60 percent impaired, limited or restricted  Carrying, Moving and Handling Objects Goal Status (): 0 percent impaired, limited or restricted    Progress Note: [x]  Yes  []  No  Next due by: Visit #13  Or 14 Feb 2018    Latex Allergy:  [x]NO      []YES  Preferred Language for Healthcare:   [x]English       []other:    Visit # Insurance Allowable   3 20     Pain level:  2/10 1/16/2018      SUBJECTIVE: When he stretches after he stretches, he feels pretty good. He has been having difficulty sleeping. It woke him up around 0430. He doesn't think he hurt it. After his last tx, he was sore. He did his HEP 1x the next day. He felt better by Sunday. He felt a pop over the anterior shoulder with TB rows. He modified it. He feels he has been pulling it too high. He is concerned that he is still tight. He is hoping that his soreness is from pushing his stretches so much. The stretching does help alleviate the pain.       OBJECTIVE: 1/16/18   Observation:   Test measurements:      ROM PROM AROM  Comment    L R L R    Flexion  112 pulleys and manual      Abduction  124 manual scaption; 132 pulleys      ER  32 at 90 with cane; 36 with manual at 90        IR  38      Other Other             Strength L R Comment   Flexion   NT secondary to protocol   Abduction      ER      IR      Supraspinatus      Upper Trap      Lower Trap      Mid Trap      Rhomboids      Biceps      Triceps      Horizontal Abduction      Horizontal Adduction      Lats          RESTRICTIONS/PRECAUTIONS: NWB, no resisted biceps for 4 weeks, 6 weeks in sling. PROM limited to 100 flexion and 60 ER for first 6 weeks.       Exercises/Interventions:   Exercise/Equipment Resistance/Repetitions Other comments   Aerobic Conditioning     Aerodyne          Stretching/PROM     Wand 10x:10 ER supine At 0, 45, and 90 ABD  And flexion in supine  scaption standing   Table Slides 10x:10 Flex/scaption   Wall slides      UE [place]     Pulleys 10x:10 Flex/scap   Pendulum hangs     BB IR 10x:10 Cane and rope   SL IR     Pec doorway stretch     CBA stretch     UT stretch LS stretch Elbow flex/ext     Wrist flex/ext               Isometrics     Retraction          Weight shift     Flexion     Abduction     External Rotation     Internal Rotation     Biceps     Triceps          PRE's     Flexion     Abduction     External Rotation     Internal Rotation     Shrugs 3x10 6#    EXT     Reverse Flys     Serratus 3x10 with cane    Horizontal Abd with ER     Biceps 3x10 4#    Triceps     Retraction     Wrist flex/ext               Cable Column/Theraband     External Rotation     Internal Rotation     Shrugs     Lats     Ext 3x10 green    Flex     Scapular Retraction 3x10 green    BIC     TRIC     PNF          Dynamic Stability          Plyoback              Therapeutic Exercise and NMR EXR  [x] (82003) Provided verbal/tactile cueing for activities related to strengthening, flexibility, endurance, ROM  for improvements in scapular, scapulothoracic and UE control with self care, reaching, carrying, lifting, house/yardwork, driving/computer work.    [] (41184) Provided verbal/tactile cueing for activities related to improving balance, coordination, kinesthetic sense, posture, motor skill, proprioception  to assist with  scapular, scapulothoracic and UE control with self care, reaching, carrying, lifting, house/yardwork, driving/computer work. Therapeutic Activities:    [] (83743 or 77601) Provided verbal/tactile cueing for activities related to improving balance, coordination, kinesthetic sense, posture, motor skill, proprioception and motor activation to allow for proper function of scapular, scapulothoracic and UE control with self care, carrying, lifting, driving/computer work.      Home Exercise Program:    [x] (42702) Reviewed/Progressed HEP activities related to strengthening, flexibility, endurance, ROM of scapular, scapulothoracic and UE control with self care, reaching, carrying, lifting, house/yardwork, driving/computer work  [] (73617) Reviewed/Progressed HEP activities related to improving balance, coordination, kinesthetic sense, posture, motor skill, proprioception of scapular, scapulothoracic and UE control with self care, reaching, carrying, lifting, house/yardwork, driving/computer work      Manual Treatments:  PROM / STM / Oscillations-Mobs:  G-I, II, III, IV (PA's, Inf., Post.)  [x] (45625) Provided manual therapy to mobilize soft tissue/joints of cervical/CT, scapular GHJ and UE for the purpose of modulating pain, promoting relaxation,  increasing ROM, reducing/eliminating soft tissue swelling/inflammation/restriction, improving soft tissue extensibility and allowing for proper ROM for normal function with self care, reaching, carrying, lifting, house/yardwork, driving/computer work   15'- flex, ABD, IR, ER with oscillations    Pt Education:     Modalities: 15' ice     Charges:   Timed Code Treatment Minutes: 37'   Total Treatment Minutes: [de-identified]'     [] EVAL (LOW) 29465   [] EVAL (MOD) 09281   [] EVAL (HIGH) 78691   [] RE-EVAL   [x] DJ(02753) x  1   [] IONTO  [] NMR (77973) x      [] VASO  [x] Manual (64207) x  1    [] Other:  [x] TA x  1    [] [facility] Traction (68641)  [] ES(attended) (97071)      [] ES (un) (44113): Therapist goals for Patient:   Short Term Goals: To be achieved in: 2 weeks  1. Pt will be independent in HEP and progression per patient tolerance, in order to prevent re-injury. -met  2. Patient will report pain at worst less than or equal to 4/10  to facilitate improvement in movement, function, and ADLs as indicated by dunctional seficits.-ongoing (6/10 in morning today)    Long Term Goals: To be achieved in: 24 weeks  1. Pt will demo a UEFI of greater than or equal to 85% to assist with reaching prior level of function. -ongoing  2. Patient will demonstrate increased AROM shoulder flexion greater than or equal to 150, ABD greater than or equal to 160, IR greater than or equal 50, ER greater than or equal to 80 to allow for proper joint functioning as indicated by patient's functional deficits.-ongoing   3. Patient will demonstrate an increase in strength to 4 to 4+ to allow for proper functional mobility as indicated by patient's functional deficits. -ongoing  4. Patient will return to work without increased symptoms or restriction. -ongoing  5. Pt will return to bowling without increasing pain or symptoms.-ongoing  6. Pt will report pain at worst less than or equal to 2/10.-ongoing    Progression Towards Functional goals:  [] Patient is progressing as expected towards functional goals listed. [] Progression is slowed due to complexities listed. [] Progression has been slowed due to co-morbidities. [x] Plan just implemented, too soon to assess goals progression  [] Other:      ASSESSMENT:      Treatment/Activity Tolerance:  [x] Patient tolerated treatment well [] Patient limited by fatigue  [] Patient limited by pain  [] Patient limited by other medical complications  [] Other:  Pt george tx fair/well.   He does demo tightness in his shoulder with his biggest limitation in flexion as this has remained fairly consistent over the past several weeks (only a 8 degree change since 12/27/17). He is reporting that the stiffness bothers him and is having difficulty sleeping. The MD was made aware and prescribed Dosepak for him. We did discuss stopping other anti-inflammatories and discussing this with pharmacist.  I did encourage him to come in again while he is on this medication to progress his ROM/stretching. Prognosis: [] Good [] Fair  [] Poor    Patient Requires Follow-up: [x] Yes  [] No    PLAN: Continue Manual Tx to ensure progressing motion.    [x] Continue per plan of care [] Alter current plan (see comments)  [] Plan of care initiated [] Hold pending MD visit [] Discharge    Electronically signed by: Rosamaria Castaneda DPT [id number]

## 2018-01-18 ENCOUNTER — HOSPITAL ENCOUNTER (OUTPATIENT)
Dept: PHYSICAL THERAPY | Age: 53
Discharge: HOME | End: 2018-01-19
Admitting: ORTHOPAEDIC SURGERY

## 2018-01-18 NOTE — FLOWSHEET NOTE
Orthopaedics and [address] [place] [address] DR JUAN CULP            Physical Therapy Daily Treatment Note  Date:  2018    Patient Name:  Xiao Lundberg  \"Zia\"  :  1965  MRN: [id number]  Medical/Treatment Diagnosis Information:  · Diagnosis: S46.011 (ICD-10-CM) - Traumatic complete tear of right rotator cuff; M25.511 (ICD-10-CM) - Acute pain of right shoulder;  s/p right RCR with open biceps tenodesis. Surgery on 17  · Treatment Diagnosis: M25.511 (ICD-10-CM) - Acute pain of right shoulder  Insurance/Certification information:  PT Insurance Information: [place]  Physician Information:  Referring Practitioner: Eliana Landeros of care signed (Y/N):     Date of Patient follow up with Physician: 2018    G-Code (if applicable):      Date G-Code Applied:  18     PT G-Codes  Functional Assessment Tool Used: UEFI  Score: 63.75%  Functional Limitation: Carrying, moving and handling objects  Carrying, Moving and Handling Objects Current Status (): At least 40 percent but less than 60 percent impaired, limited or restricted  Carrying, Moving and Handling Objects Goal Status (): 0 percent impaired, limited or restricted    Progress Note: []  Yes  [x]  No  Next due by: Visit #13  Or 2018    Latex Allergy:  [x]NO      []YES  Preferred Language for Healthcare:   [x]English       []other:    Visit # Insurance Allowable   4 20     Pain level:  2/10 2018      SUBJECTIVE:  He started taking the medicine (Iliana Mohair), which has helped him sleep. However, he still feels stiff. He did his HEP, but he did not do them this morning as he knew he was going to be coming here. He also feels very stiff currently as he just woke up and did not get to stretch. He is concerned it is not moving the way it should yet.       OBJECTIVE: 18   Observation:   Test measurements:      ROM PROM AROM  Comment    L R L R    Flexion  125 pulleys and 135 manual 180 118 cold    Abduction  130 manual scaption; EVAL (HIGH) 33165   [] RE-EVAL   [x] FP(86010) x  1   [] IONTO  [] NMR (59952) x      [] VASO  [x] Manual (94891) x  1    [] Other:  [x] TA x  1    [] Mech Traction (41699)  [] ES(attended) (22044)      [] ES (un) (49314): Therapist goals for Patient:   Short Term Goals: To be achieved in: 2 weeks  1. Pt will be independent in HEP and progression per patient tolerance, in order to prevent re-injury. -met  2. Patient will report pain at worst less than or equal to 4/10  to facilitate improvement in movement, function, and ADLs as indicated by dunctional seficits.-ongoing (6/10 in morning today)    Long Term Goals: To be achieved in: 24 weeks  1. Pt will demo a UEFI of greater than or equal to 85% to assist with reaching prior level of function. -ongoing  2. Patient will demonstrate increased AROM shoulder flexion greater than or equal to 150, ABD greater than or equal to 160, IR greater than or equal 50, ER greater than or equal to 80 to allow for proper joint functioning as indicated by patient's functional deficits.-ongoing   3. Patient will demonstrate an increase in strength to 4 to 4+ to allow for proper functional mobility as indicated by patient's functional deficits. -ongoing  4. Patient will return to work without increased symptoms or restriction. -ongoing  5. Pt will return to bowling without increasing pain or symptoms.-ongoing  6. Pt will report pain at worst less than or equal to 2/10.-ongoing    Progression Towards Functional goals:  [] Patient is progressing as expected towards functional goals listed. [] Progression is slowed due to complexities listed. [] Progression has been slowed due to co-morbidities.   [x] Plan just implemented, too soon to assess goals progression  [] Other:      ASSESSMENT:      Treatment/Activity Tolerance:  [x] Patient tolerated treatment well [] Patient limited by fatigue  [] Patient limited by pain  [] Patient limited by other medical complications  [] Other:  Pt george tx fair/well. He was sore, but he demo significant improvement in his ROM from his last visit due to the [address]. He is apprehensive that it is not progressing well. I have reviewed the numbers with him and showed him that he has improved significant. We spent time discussing what to expect. Prognosis: [] Good [] Fair  [] Poor    Patient Requires Follow-up: [x] Yes  [] No    PLAN: Continue Manual Tx to ensure progressing motion.    [x] Continue per plan of care [] Alter current plan (see comments)  [] Plan of care initiated [] Hold pending MD visit [] Discharge    Electronically signed by: Bhavin Cannon DPT [id number]

## 2018-01-22 ENCOUNTER — HOSPITAL ENCOUNTER (OUTPATIENT)
Dept: PHYSICAL THERAPY | Age: 53
Discharge: HOME | End: 2018-01-23
Admitting: ORTHOPAEDIC SURGERY

## 2018-01-22 NOTE — FLOWSHEET NOTE
Orthopaedics and [address] [place] [address] DR JUAN CULP            Physical Therapy Daily Treatment Note  Date:  2018    Patient Name:  Comfort Bailon  \"Zia\"  :  1965  MRN: [id number]  Medical/Treatment Diagnosis Information:  · Diagnosis: S46.011 (ICD-10-CM) - Traumatic complete tear of right rotator cuff; M25.511 (ICD-10-CM) - Acute pain of right shoulder;  s/p right RCR with open biceps tenodesis. Surgery on 17  · Treatment Diagnosis: M25.511 (ICD-10-CM) - Acute pain of right shoulder  Insurance/Certification information:  PT Insurance Information: [place]  Physician Information:  Referring Practitioner: Heron Garcias of care signed (Y/N):     Date of Patient follow up with Physician: 2018    G-Code (if applicable):      Date G-Code Applied:  18     PT G-Codes  Functional Assessment Tool Used: UEFI  Score: 63.75%  Functional Limitation: Carrying, moving and handling objects  Carrying, Moving and Handling Objects Current Status (): At least 40 percent but less than 60 percent impaired, limited or restricted  Carrying, Moving and Handling Objects Goal Status (): 0 percent impaired, limited or restricted    Progress Note: []  Yes  [x]  No  Next due by: Visit #13  Or 2018    Latex Allergy:  [x]NO      []YES  Preferred Language for Healthcare:   [x]English       []other:    Visit # Insurance Allowable   5 20     Pain level:  -2/10 2018      SUBJECTIVE: He finished the Dosepak yesterday morning. His arm was a little sore this morning. He stretched against the wall a little bit agains the motion. He feels his IR and ER are better. He also feels cane flexion has gotten much better. He feels he has gotten to the top of his motion with table slides and pulleys. He feels most of his tightness is in his biceps. He doesn't feel this is sharp pains.         OBJECTIVE: 18  Observation:   Test measurements:      ROM PROM AROM  Comment    L R L R    Flexion   145 manual     Abduction  166 manual      ER  58 with manual at 90       IR  35     Other        Other             Strength L R Comment   Flexion   NT secondary to protocol   Abduction      ER      IR      Supraspinatus      Upper Trap      Lower Trap      Mid Trap      Rhomboids      Biceps      Triceps      Horizontal Abduction      Horizontal Adduction      Lats          RESTRICTIONS/PRECAUTIONS: NWB, no resisted biceps for 4 weeks, 6 weeks in sling. PROM limited to 100 flexion and 60 ER for first 6 weeks.       Exercises/Interventions:   Exercise/Equipment Resistance/Repetitions Other comments   Aerobic Conditioning     Aerodyne          Stretching/PROM     Wand 10x:10 ER supine At 0, 45, and 90 ABD  And flexion in supine;  scaption standing   Table Slides Wall slides  10x:10 Flex/scaption   UE [place]     Pulleys 5x:10 Flex/scap/ABD   Pendulum hangs     BB IR 5x:10 cane  10x:10 towel    SL IR 10x:10 On wall   Pec doorway stretch     CBA stretch 10x:10    UT stretch LS stretch Elbow flex/ext     Wrist flex/ext               Isometrics     Retraction          Weight shift     Flexion     Abduction     External Rotation     Internal Rotation     Biceps     Triceps          PRE's     Flexion     Abduction     External Rotation     Internal Rotation     Shrugs 3x10 7#    EXT     Reverse Flys     Serratus 3x10 1#    Horizontal Abd with ER     Biceps 3x10 5#    Triceps     Retraction     Wrist flex/ext               Cable Column/Theraband     External Rotation     Internal Rotation     Shrugs     Lats     Ext 3x10 blue    Flex     Scapular Retraction 3x10 blue    BIC     TRIC     PNF          Dynamic Stability          Plyoback              Therapeutic Exercise and NMR EXR  [x] (50011) Provided verbal/tactile cueing for activities related to strengthening, flexibility, endurance, ROM  for improvements in scapular, scapulothoracic and UE control with self care, reaching, carrying, lifting, house/yardwork, [] RE-EVAL   [x] GC(40990) x  1   [] IONTO  [] NMR (48042) x      [] VASO  [x] Manual (34254) x  1    [] Other:  [x] TA x  2    [] Mech Traction (32595)  [] ES(attended) (15320)      [] ES (un) (76756): Therapist goals for Patient:   Short Term Goals: To be achieved in: 2 weeks  1. Pt will be independent in HEP and progression per patient tolerance, in order to prevent re-injury. -met  2. Patient will report pain at worst less than or equal to 4/10  to facilitate improvement in movement, function, and ADLs as indicated by dunctional seficits.-ongoing (6/10 in morning today)    Long Term Goals: To be achieved in: 24 weeks  1. Pt will demo a UEFI of greater than or equal to 85% to assist with reaching prior level of function. -ongoing  2. Patient will demonstrate increased AROM shoulder flexion greater than or equal to 150, ABD greater than or equal to 160, IR greater than or equal 50, ER greater than or equal to 80 to allow for proper joint functioning as indicated by patient's functional deficits.-ongoing   3. Patient will demonstrate an increase in strength to 4 to 4+ to allow for proper functional mobility as indicated by patient's functional deficits. -ongoing  4. Patient will return to work without increased symptoms or restriction. -ongoing  5. Pt will return to bowling without increasing pain or symptoms.-ongoing  6. Pt will report pain at worst less than or equal to 2/10.-ongoing    Progression Towards Functional goals:  [] Patient is progressing as expected towards functional goals listed. [] Progression is slowed due to complexities listed. [] Progression has been slowed due to co-morbidities. [x] Plan just implemented, too soon to assess goals progression  [] Other:      ASSESSMENT:      Treatment/Activity Tolerance:  [x] Patient tolerated treatment well [] Patient limited by fatigue  [] Patient limited by pain  [] Patient limited by other medical complications  [] Other:  Pt george tx well.   He continues to be apprehensive about his ability to progress, but he has made good gains in ROM since his last visit. We did review his HEP, and I gave him pictures of others ways to stretch IR. He feels comfortable continuing with these at home. Prognosis: [] Good [] Fair  [] Poor    Patient Requires Follow-up: [x] Yes  [] No    PLAN: Continue Manual Tx to ensure progressing motion. If pt feels he is still tight over this week, he can call, and I will try to get him in another time this week. However, I believe that he should be okay at 1x/wk at this point if he continues to make good progress.     [x] Continue per plan of care [] Alter current plan (see comments)  [] Plan of care initiated [] Hold pending MD visit [] Discharge    Electronically signed by: Luc Stallings DPT [id number]

## 2018-01-30 ENCOUNTER — HOSPITAL ENCOUNTER (OUTPATIENT)
Dept: PHYSICAL THERAPY | Age: 53
Discharge: HOME | End: 2018-01-31
Admitting: ORTHOPAEDIC SURGERY

## 2018-01-30 NOTE — FLOWSHEET NOTE
Trap      Lower Trap      Mid Trap      Rhomboids      Biceps      Triceps      Horizontal Abduction      Horizontal Adduction      Lats          RESTRICTIONS/PRECAUTIONS: NWB, no resisted biceps for 4 weeks, 6 weeks in sling. PROM limited to 100 flexion and 60 ER for first 6 weeks.       Exercises/Interventions:   Exercise/Equipment Resistance/Repetitions Other comments   Aerobic Conditioning     Aerodyne          Stretching/PROM     Wand 10x:10 ER supine At 0, 45, and 90 ABD  And flexion in supine;  scaption standing   Table Slides Wall slides  10x:10 Flex/scaption   UE [place]     Pulleys 5x:10 Flex/scap/ABD   Pendulum hangs     BB IR   10x:10 pulleys    SL IR 10x:10 On wall   Pec doorway stretch     CBA stretch 10x:10    UT stretch LS stretch Elbow flex/ext     Wrist flex/ext               Isometrics     Retraction          Weight shift     Flexion     Abduction     External Rotation     Internal Rotation     Biceps     Triceps          PRE's     Flexion     Abduction     External Rotation     Internal Rotation     Shrugs 3x10 7#    EXT     Reverse Flys     Serratus 3x10 3#    Horizontal Abd with ER     Biceps 3x10 6#    Triceps     Retraction     Wrist flex/ext               Cable Column/Theraband     External Rotation     Internal Rotation     Shrugs     Lats     Ext 3x10 blue    Flex     Scapular Retraction 3x10 blue    BIC     TRIC     PNF          Dynamic Stability          Plyoback              Therapeutic Exercise and NMR EXR  [x] (45907) Provided verbal/tactile cueing for activities related to strengthening, flexibility, endurance, ROM  for improvements in scapular, scapulothoracic and UE control with self care, reaching, carrying, lifting, house/yardwork, driving/computer work.    [] (16140) Provided verbal/tactile cueing for activities related to improving balance, coordination, kinesthetic sense, posture, motor skill, proprioception  to assist with  scapular, scapulothoracic and UE control with Prognosis: [] Good [] Fair  [] Poor    Patient Requires Follow-up: [x] Yes  [] No    PLAN: Continue 1x/wk and focus on ROM.     [x] Continue per plan of care [] Alter current plan (see comments)  [] Plan of care initiated [] Hold pending MD visit [] Discharge    Electronically signed by: Bhavin Cannon DPT [id number]

## 2018-02-01 ENCOUNTER — HOSPITAL ENCOUNTER (OUTPATIENT)
Dept: OTHER | Age: 53
Discharge: HOME | End: 2018-02-28
Attending: ORTHOPAEDIC SURGERY | Admitting: ORTHOPAEDIC SURGERY

## 2018-02-06 ENCOUNTER — HOSPITAL ENCOUNTER (OUTPATIENT)
Dept: PHYSICAL THERAPY | Age: 53
Discharge: HOME | End: 2018-02-07
Admitting: ORTHOPAEDIC SURGERY

## 2018-02-06 NOTE — FLOWSHEET NOTE
Orthopaedics and [address] [place] [address] DR JUAN CULP            Physical Therapy Daily Treatment Note  Date:  2018    Patient Name:  Halina Borjas  \"Zia\"  :  1965  MRN: [id number]  Medical/Treatment Diagnosis Information:  · Diagnosis: Q51.892 (ICD-10-CM) - Traumatic complete tear of right rotator cuff; M25.511 (ICD-10-CM) - Acute pain of right shoulder;  s/p right RCR with open biceps tenodesis. Surgery on 17  · Treatment Diagnosis: M25.511 (ICD-10-CM) - Acute pain of right shoulder  Insurance/Certification information:  PT Insurance Information: [employer]  Physician Information:  Referring Practitioner: Jazz Richards of care signed (Y/N):     Date of Patient follow up with Physician: 2018    G-Code (if applicable):      Date G-Code Applied:  18     PT G-Codes  Functional Assessment Tool Used: UEFI  Score: 63.75%  Functional Limitation: Carrying, moving and handling objects  Carrying, Moving and Handling Objects Current Status (): At least 40 percent but less than 60 percent impaired, limited or restricted  Carrying, Moving and Handling Objects Goal Status (): 0 percent impaired, limited or restricted    Progress Note: []  Yes  [x]  No  Next due by: Visit #13  Or 2018    Latex Allergy:  [x]NO      []YES  Preferred Language for Healthcare:   [x]English       []other:    Visit # Insurance Allowable   7 20     Pain level:  1/10 2018      SUBJECTIVE:  He is feeling pretty good. He is starting to feel like it may get normal again. If he feels stiff in any way, he just does some wall walks and stretches.         OBJECTIVE: 18  Observation:   Test measurements:      ROM PROM AROM  Comment    L R L R    Flexion   170 manual 162     Abduction   180     ER   78 with manual at 90   85     IR  40 manual 35    Other        Other             Strength L R Comment   Flexion      Abduction      ER      IR      Supraspinatus      Upper Trap      Lower Trap      Mid Trap weeks  1. Pt will be independent in HEP and progression per patient tolerance, in order to prevent re-injury. -met  2. Patient will report pain at worst less than or equal to 4/10  to facilitate improvement in movement, function, and ADLs as indicated by dunctional seficits.-ongoing (6/10 in morning today)    Long Term Goals: To be achieved in: 24 weeks  1. Pt will demo a UEFI of greater than or equal to 85% to assist with reaching prior level of function. -ongoing  2. Patient will demonstrate increased AROM shoulder flexion greater than or equal to 150, ABD greater than or equal to 160, IR greater than or equal 50, ER greater than or equal to 80 to allow for proper joint functioning as indicated by patient's functional deficits.-ongoing   3. Patient will demonstrate an increase in strength to 4 to 4+ to allow for proper functional mobility as indicated by patient's functional deficits. -ongoing  4. Patient will return to work without increased symptoms or restriction. -ongoing  5. Pt will return to bowling without increasing pain or symptoms.-ongoing  6. Pt will report pain at worst less than or equal to 2/10.-ongoing    Progression Towards Functional goals:  [] Patient is progressing as expected towards functional goals listed. [] Progression is slowed due to complexities listed. [] Progression has been slowed due to co-morbidities. [x] Plan just implemented, too soon to assess goals progression  [] Other:      ASSESSMENT:      Treatment/Activity Tolerance:  [x] Patient tolerated treatment well [] Patient limited by fatigue  [] Patient limited by pain  [] Patient limited by other medical complications  [] Other:  Pt george tx well. He demo significantly improved ROM. He is starting to feel better and more like himself. He is feeling more comfortable and confident. He is now where he should be in regards to his protocol. He will see Dr. Soyla Boas next week.   At that point I anticipate that we will continue to

## 2018-02-13 ENCOUNTER — OFFICE VISIT (OUTPATIENT)
Dept: ORTHOPEDIC SURGERY | Age: 53
End: 2018-02-13

## 2018-02-13 ENCOUNTER — HOSPITAL ENCOUNTER (OUTPATIENT)
Dept: PHYSICAL THERAPY | Age: 53
Discharge: HOME | End: 2018-02-14
Admitting: ORTHOPAEDIC SURGERY

## 2018-02-13 VITALS — HEIGHT: 70 IN | WEIGHT: 195 LBS | RESPIRATION RATE: 12 BRPM | BODY MASS INDEX: 27.92 KG/M2

## 2018-02-13 DIAGNOSIS — M75.21 BICEPS TENDINITIS OF RIGHT SHOULDER: Primary | ICD-10-CM

## 2018-02-13 DIAGNOSIS — S46.011D TRAUMATIC COMPLETE TEAR OF RIGHT ROTATOR CUFF, SUBSEQUENT ENCOUNTER: ICD-10-CM

## 2018-02-13 PROCEDURE — 99024 POSTOP FOLLOW-UP VISIT: CPT | Performed by: ORTHOPAEDIC SURGERY

## 2018-02-13 NOTE — LETTER
[address] [address]  Phone: [phone]  Fax: [phone]    Lucho Conner MD  February 20, 2018     [address] [id number]    Patient: Luis Rueda  MR Number: [id number]  YOB: 1965  Date of Visit: 2/13/2018    Dear Dr. Bipin Harrison are the relevant portions of my assessment and plan of care. Luis Rueda returns today about 4 weeks out from right shoulder arthroscopy with rotator cuff repair and biceps tenodesis. Overall, he is doing very well. He typically has no pain. Occasionally, he will have mild discomfort. He feels like his range of motion and function are improving every day. Today, his incisions are all well-healed. He has 4/5 strength in all planes. He has mild tightness in internal rotation but can easily reaches back. Neurologic and vascular exams were extremity are normal and incisions are nontender. Assessment: Doing well status post right rotator cuff surgery. Plan: Continue with therapy. Follow-up with me in 2 months. This note was created using voice recognition software. It has been proofread, but occasionally errors remain. Please disregard these errors. They will be corrected as they are noted. If you have questions, please do not hesitate to call me. I look forward to following Diamond Shelby along with you.     Sincerely,      Lucho Conner MD

## 2018-02-13 NOTE — PLAN OF CARE
Orthopaedics and [address] [place] [address] DR JUAN CULP       Physical Therapy Re-Certification Plan of Care    Dear Dr. Elda Rowland,    We had the pleasure of treating the following patient for physical therapy services at [address] [place] [address]. A summary of our findings can be found in the updated assessment below. This includes our plan of care. If you have any questions or concerns regarding these findings, please do not hesitate to contact me at the office phone number checked above. Thank you for the referral.     Physician Signature:________________________________Date:__________________  By signing above (or electronic signature), therapists plan is approved by physician    Date Range Of Visits:   Total Visits to Date: 8 this year  Overall Response to Treatment:   [x]Patient is responding well to treatment and improvement is noted with regards  to goals   []Patient should continue to improve in reasonable time if they continue HEP   []Patient has plateaued and is no longer responding to skilled PT intervention    []Patient is getting worse and would benefit from return to referring MD   []Patient unable to adhere to initial POC   []Other: Pt george tx well. He is making appropriate progress in his protocol. He was challenged by the isometrics, as anticipated. He was given these for home. We did discuss frequency and duration of exercises (ie strengthening every other day and stretches daily). We discussed pushing stretches to his comfort, especially as the motion is coming along appropriately. Physical Therapy Daily Treatment Note  Date:  2018    Patient Name:  Halina Borjas  \"Zia\"  :  1965  MRN: [id number]  Medical/Treatment Diagnosis Information:  · Diagnosis: H93.077 (ICD-10-CM) - Traumatic complete tear of right rotator cuff; M25.511 (ICD-10-CM) - Acute pain of right shoulder;  s/p right RCR with open biceps tenodesis.   Surgery on Conditioning     Aerodyne          Stretching/PROM     Wand 10x:10 ER supine At 0, 45, and 90 ABD  And flexion in supine;  scaption standing   Table Slides Wall slides  5x:10 Flex/scaption   UE [place]     Pulleys 5x:10 Flex/scap/ABD   Pendulum hangs     BB IR   10x:10 pulleys    SL IR 10x:10 On wall   Pec doorway stretch     CBA stretch 10x:10    UT stretch LS stretch Elbow flex/ext     Wrist flex/ext               Isometrics     Retraction          Weight shift     Flexion 10x:10    Abduction 10x:10    External Rotation 10x:10    Internal Rotation 10x:10    Biceps     Triceps          PRE's     Flexion     Abduction     External Rotation     Internal Rotation        EXT     Reverse Flys     Serratus 3x10 10#    Horizontal Abd with ER        Triceps     Retraction     Wrist flex/ext               Cable Column/Theraband     External Rotation     Internal Rotation     Shrugs     Lats     Ext 3x10 black    Flex     Scapular Retraction 3x10 black    BIC     TRIC     PNF          Dynamic Stability          Plyoback              Therapeutic Exercise and NMR EXR  [x] (94871) Provided verbal/tactile cueing for activities related to strengthening, flexibility, endurance, ROM  for improvements in scapular, scapulothoracic and UE control with self care, reaching, carrying, lifting, house/yardwork, driving/computer work.    [] (16345) Provided verbal/tactile cueing for activities related to improving balance, coordination, kinesthetic sense, posture, motor skill, proprioception  to assist with  scapular, scapulothoracic and UE control with self care, reaching, carrying, lifting, house/yardwork, driving/computer work.     Therapeutic Activities:    [] (44849 or 52200) Provided verbal/tactile cueing for activities related to improving balance, coordination, kinesthetic sense, posture, motor skill, proprioception and motor activation to allow for proper function of scapular, scapulothoracic and UE control with self care, carrying, lifting, driving/computer work. Home Exercise Program:    [x] (10413) Reviewed/Progressed HEP activities related to strengthening, flexibility, endurance, ROM of scapular, scapulothoracic and UE control with self care, reaching, carrying, lifting, house/yardwork, driving/computer work  [] (01628) Reviewed/Progressed HEP activities related to improving balance, coordination, kinesthetic sense, posture, motor skill, proprioception of scapular, scapulothoracic and UE control with self care, reaching, carrying, lifting, house/yardwork, driving/computer work      Manual Treatments:  PROM / STM / Oscillations-Mobs:  G-I, II, III, IV (PA's, Inf., Post.)  [x] (68712) Provided manual therapy to mobilize soft tissue/joints of cervical/CT, scapular GHJ and UE for the purpose of modulating pain, promoting relaxation,  increasing ROM, reducing/eliminating soft tissue swelling/inflammation/restriction, improving soft tissue extensibility and allowing for proper ROM for normal function with self care, reaching, carrying, lifting, house/yardwork, driving/computer work   10'- flex, IR, ER with oscillations    Pt Education:     Modalities: 10' ice     Charges:    Timed Code Treatment Minutes: 40'   Total Treatment Minutes: [de-identified]'     [] EVAL (LOW) 74331   [] EVAL (MOD) 18539   [] EVAL (HIGH) 46588    [] RE-EVAL   [x] OD(03423) x  1   [] IONTO  [] NMR (85879) x      [] VASO  [x] Manual (66458) x  1    [] Other:  [x] TA x  1    [] Mech Traction (69655)  [] ES(attended) (22691)      [] ES (un) (14524): Therapist goals for Patient:   Short Term Goals: To be achieved in: 2 weeks  1. Pt will be independent in HEP and progression per patient tolerance, in order to prevent re-injury. -met  2. Patient will report pain at worst less than or equal to 4/10  to facilitate improvement in movement, function, and ADLs as indicated by dunctional seficits.-ongoing (6/10 in morning today)    Long Term Goals:  To be achieved in: 24 weeks  1. Pt will demo a UEFI of greater than or equal to 85% to assist with reaching prior level of function. -ongoing  2. Patient will demonstrate increased AROM shoulder flexion greater than or equal to 150, ABD greater than or equal to 160, IR greater than or equal 50, ER greater than or equal to 80 to allow for proper joint functioning as indicated by patient's functional deficits.-ongoing   3. Patient will demonstrate an increase in strength to 4 to 4+ to allow for proper functional mobility as indicated by patient's functional deficits. -ongoing  4. Patient will return to work without increased symptoms or restriction. -ongoing  5. Pt will return to bowling without increasing pain or symptoms.-ongoing  6. Pt will report pain at worst less than or equal to 2/10.-ongoing    Progression Towards Functional goals:  [] Patient is progressing as expected towards functional goals listed. [] Progression is slowed due to complexities listed. [] Progression has been slowed due to co-morbidities. [x] Plan just implemented, too soon to assess goals progression  [] Other:      ASSESSMENT:      Treatment/Activity Tolerance:  [x] Patient tolerated treatment well [] Patient limited by fatigue  [] Patient limited by pain  [] Patient limited by other medical complications  [] Other:  Pt george tx well. He is making appropriate progress in his protocol. He was challenged by the isometrics, as anticipated. He was given these for home. We did discuss frequency and duration of exercises (ie strengthening every other day and stretches daily). We discussed pushing stretches to his comfort, especially as the motion is coming along appropriately. Prognosis: [] Good [] Fair  [] Poor    Patient Requires Follow-up: [x] Yes  [] No    PLAN:  Consider supine flexion versus scaption, TB IR and ER.     [x] Continue per plan of care [] Alter current plan (see comments)  [] Plan of care initiated [] Hold pending MD visit [] Discharge    Electronically signed by: Bhavin Cannon DPT [id number]

## 2018-02-13 NOTE — PROGRESS NOTES
Fanny Sandhu returns today about 4 weeks out from right shoulder arthroscopy with rotator cuff repair and biceps tenodesis. Overall, he is doing very well. He typically has no pain. Occasionally, he will have mild discomfort. He feels like his range of motion and function are improving every day. Today, his incisions are all well-healed. He has 4/5 strength in all planes. He has mild tightness in internal rotation but can easily reaches back. Neurologic and vascular exams were extremity are normal and incisions are nontender. Assessment: Doing well status post right rotator cuff surgery. Plan: Continue with therapy. Follow-up with me in 2 months. This note was created using voice recognition software. It has been proofread, but occasionally errors remain. Please disregard these errors. They will be corrected as they are noted.

## 2018-02-20 ENCOUNTER — HOSPITAL ENCOUNTER (OUTPATIENT)
Dept: PHYSICAL THERAPY | Age: 53
Discharge: HOME | End: 2018-02-21
Admitting: ORTHOPAEDIC SURGERY

## 2018-02-20 NOTE — FLOWSHEET NOTE
Orthopaedics and [address] [place] [address] DR JUAN CULP            Physical Therapy Daily Treatment Note  Date:  2018    Patient Name:  Earma Maker  \"Zia\"  :  1965  MRN: [id number]  Medical/Treatment Diagnosis Information:  · Diagnosis: B41.359 (ICD-10-CM) - Traumatic complete tear of right rotator cuff; M25.511 (ICD-10-CM) - Acute pain of right shoulder;  s/p right RCR with open biceps tenodesis. Surgery on 17  · Treatment Diagnosis: M25.511 (ICD-10-CM) - Acute pain of right shoulder  Insurance/Certification information:  PT Insurance Information: [employer]  Physician Information:  Referring Practitioner: Subhash Martir of care signed (Y/N):     Date of Patient follow up with Physician: pt to schedule around 2018    G-Code (if applicable):      Date G-Code Applied:  18     PT G-Codes  Functional Assessment Tool Used: UEFI  Score: 88.75%  Functional Limitation: Carrying, moving and handling objects  Carrying, Moving and Handling Objects Current Status (): At least 1 percent but less than 20 percent impaired, limited or restricted  Carrying, Moving and Handling Objects Goal Status (): 0 percent impaired, limited or restricted     Progress Note: []  Yes  [x]  No  Next due by: Visit #  Or 13 Mar 2018    Latex Allergy:  [x]NO      []YES  Preferred Language for Healthcare:   [x]English       []other:    Visit # Insurance Allowable   10 20     Pain level: 1/10 2018       SUBJECTIVE:  Patient stated that his shoulder is starting to feel like a \"real shoulder. \" Starting to use it more often.      OBJECTIVE: 18   Observation:   Test measurements:      ROM PROM AROM  Comment    L R L R    Flexion  168    Abduction  179    ER  79    IR  50    Other        Other             Strength L R Comment   Flexion      Abduction      ER      IR      Supraspinatus      Upper Trap      Lower Trap      Mid Trap      Rhomboids      Biceps      Triceps      Horizontal Abduction

## 2018-02-27 ENCOUNTER — HOSPITAL ENCOUNTER (OUTPATIENT)
Dept: PHYSICAL THERAPY | Age: 53
Discharge: HOME | End: 2018-02-28
Admitting: ORTHOPAEDIC SURGERY

## 2018-02-27 NOTE — FLOWSHEET NOTE
Orthopaedics and [address] [place] [address] DR JUAN CULP            Physical Therapy Daily Treatment Note  Date:  2018    Patient Name:  Leah Galloway  \"Zia\"  :  1965  MRN: [id number]  Medical/Treatment Diagnosis Information:  · Diagnosis: T44.405 (ICD-10-CM) - Traumatic complete tear of right rotator cuff; M25.511 (ICD-10-CM) - Acute pain of right shoulder;  s/p right RCR with open biceps tenodesis. Surgery on 17  · Treatment Diagnosis: M25.511 (ICD-10-CM) - Acute pain of right shoulder  Insurance/Certification information:  PT Insurance Information: [place]  Physician Information:  Referring Practitioner: Marii Colbert of care signed (Y/N):     Date of Patient follow up with Physician: pt to schedule around 2018    G-Code (if applicable):      Date G-Code Applied:  18     PT G-Codes  Functional Assessment Tool Used: UEFI  Score: 88.75%  Functional Limitation: Carrying, moving and handling objects  Carrying, Moving and Handling Objects Current Status (): At least 1 percent but less than 20 percent impaired, limited or restricted  Carrying, Moving and Handling Objects Goal Status (): 0 percent impaired, limited or restricted     Progress Note: []  Yes  [x]  No  Next due by: Visit #  Or 13 Mar 2018    Latex Allergy:  [x]NO      []YES  Preferred Language for Healthcare:   [x]English       []other:    Visit # Insurance Allowable   11 20     Pain level: 0/10 2018      SUBJECTIVE:  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      OBJECTIVE: 18   Observation:   Test measurements:      ROM PROM AROM  Comment    L R L R    Flexion  169    Abduction  180    ER  80    IR  50    Other        Other             Strength L R Comment   Flexion      Abduction      ER      IR      Supraspinatus      Upper Trap      Lower Trap      Mid Trap      Rhomboids      Biceps      Triceps Horizontal Abduction      Horizontal Adduction      Lats          RESTRICTIONS/PRECAUTIONS: NWB, no resisted biceps for 4 weeks, 6 weeks in sling. PROM limited to 100 flexion and 60 ER for first 6 weeks.       Exercises/Interventions:   Exercise/Equipment Resistance/Repetitions Other comments   Aerobic Conditioning     Aerodyne 8'          Stretching/PROM     Wand 10x:10 ER supine At 90 ABD  scaption standing   Table Slides Wall slides  5x:10 Flex/scaption   UE [place]     Pulleys     Pendulum hangs     BB IR 10x:10 pulleys    SL IR 10x:10 On wall   Pec doorway stretch     CBA stretch 10x:10    UT stretch LS stretch Elbow flex/ext     Wrist flex/ext               Isometrics     Retraction          Weight shift     Flexion Supine 5' 1#    Abduction    External Rotation     Internal Rotation     Biceps     Triceps          PRE's     Flexion     Abduction Standing scaption 3x10    External Rotation     Internal Rotation     [id number] 15#   EXT     Reverse Flys     Serratus 3x10 10#    Horizontal Abd with ER     [id number] 10#   Triceps 3x10 4 plates    Retraction     Wrist flex/ext               Cable Column/Theraband     External Rotation 3x10 red    Internal Rotation 3x10 green    Shrugs     Lats     Ext 3x10 silver    Flex     Scapular Retraction 3x10 silver    BIC     TRIC     PNF     Scap Depression  3x10 Blue TB    Horizontal Scap Abduction  3x10 Green TB On foam roller         Dynamic Stability     Wall circles ball on wall 30x each direction          Plyoback              Therapeutic Exercise and NMR EXR  [x] (53134) Provided verbal/tactile cueing for activities related to strengthening, flexibility, endurance, ROM  for improvements in scapular, scapulothoracic and UE control with self care, reaching, carrying, lifting, house/yardwork, driving/computer work.    [] (44356) Provided verbal/tactile cueing for activities related to improving balance, coordination, kinesthetic sense, posture, motor skill, Traction (07944)  [] ES(attended) (06078)      [] ES (un) (35783): Therapist goals for Patient:   Short Term Goals: To be achieved in: 2 weeks  1. Pt will be independent in HEP and progression per patient tolerance, in order to prevent re-injury. -met  2. Patient will report pain at worst less than or equal to 4/10  to facilitate improvement in movement, function, and ADLs as indicated by dunctional seficits. -met  Long Term Goals: To be achieved in: 24 weeks  1. Pt will demo a UEFI of greater than or equal to 85% to assist with reaching prior level of function. -ongoing  2. Patient will demonstrate increased AROM shoulder flexion greater than or equal to 150, ABD greater than or equal to 160, IR greater than or equal 50, ER greater than or equal to 80 to allow for proper joint functioning as indicated by patient's functional deficits.-ongoing   3. Patient will demonstrate an increase in strength to 4 to 4+ to allow for proper functional mobility as indicated by patient's functional deficits. -ongoing  4. Patient will return to work without increased symptoms or restriction. -ongoing  5. Pt will return to bowling without increasing pain or symptoms.-ongoing  6. Pt will report pain at worst less than or equal to 2/10.-ongoing    Progression Towards Functional goals:  [] Patient is progressing as expected towards functional goals listed. [] Progression is slowed due to complexities listed. [] Progression has been slowed due to co-morbidities. [x] Plan just implemented, too soon to assess goals progression  [] Other:      ASSESSMENT:      Treatment/Activity Tolerance:  [x] Patient tolerated treatment well [] Patient limited by fatigue  [] Patient limited by pain  [] Patient limited by other medical complications  [] Other:  Patient tolerated new exercises well. Patient shoulder was fatigued with no increase in pain.  Patient required cues for positioning and execution for external rotation to avoid extending elbow or rotating body during exercise. Prognosis: [] Good [] Fair  [] Poor    Patient Requires Follow-up: [x] Yes  [] No    PLAN:  Increase triceps and depression weight. Following next session, plan to see patient every other week for progressions. Pt to avoid using leaf blower at this time. [x] Continue per plan of care [] Alter current plan (see comments)  [] Plan of care initiated [] Hold pending MD visit [] Discharge    Electronically signed by: Darrell Judge, Student Physical Therapist  Therapist was present, directed the patient's care, made skilled judgement, and was responsible for assessment and treatment of the patient.     FAUSTINO Canas [id number]

## 2018-03-01 ENCOUNTER — HOSPITAL ENCOUNTER (OUTPATIENT)
Dept: OTHER | Age: 53
Discharge: HOME | End: 2018-03-31
Attending: ORTHOPAEDIC SURGERY | Admitting: ORTHOPAEDIC SURGERY

## 2018-03-06 ENCOUNTER — HOSPITAL ENCOUNTER (OUTPATIENT)
Dept: PHYSICAL THERAPY | Age: 53
Discharge: HOME | End: 2018-03-07
Admitting: ORTHOPAEDIC SURGERY

## 2018-03-06 NOTE — PLAN OF CARE
Orthopaedics and [address] [place] [address] DR JUAN CULP     Physical Therapy Re-Certification Plan of Care    Dear Dr. Rubina Gutierres,    We had the pleasure of treating the following patient for physical therapy services at [address] [place] [address]. A summary of our findings can be found in the updated assessment below. This includes our plan of care. If you have any questions or concerns regarding these findings, please do not hesitate to contact me at the office phone number checked above. Thank you for the referral.     Physician Signature:________________________________Date:__________________  By signing above (or electronic signature), therapists plan is approved by physician    Date Range Of Visits: 18-3/6/2018  Total Visits to Date: 25  Overall Response to Treatment:   [x]Patient is responding well to treatment and improvement is noted with regards  to goals   []Patient should continue to improve in reasonable time if they continue HEP   []Patient has plateaued and is no longer responding to skilled PT intervention    []Patient is getting worse and would benefit from return to referring MD   []Patient unable to adhere to initial POC   []Other: Patient george session well. He is progressing appropriately and following his HEP regularly. Pt has been able to increase his resistance or repetitions for all exercises including rotator cuff strengthening activities. Pt to follow up with PT in 2 weeks to progress exercises appropriately as well as update POC. Physical Therapy Daily Treatment Note  Date:  3/6/2018    Patient Name:  Kevin Souza  \"Zia\"  :  1965  MRN: [id number]  Medical/Treatment Diagnosis Information:  · Diagnosis: E76.763 (ICD-10-CM) - Traumatic complete tear of right rotator cuff; M25.511 (ICD-10-CM) - Acute pain of right shoulder;  s/p right RCR with open biceps tenodesis.   Surgery on 17  · Treatment Diagnosis: M25.511 (ICD-10-CM) - Acute pain of right shoulder  Insurance/Certification information:  PT Insurance Information: [place]  Physician Information:  Referring Practitioner: Lola  care signed (Y/N):     Date of Patient follow up with Physician: pt to schedule around 13 April 2018    G-Code (if applicable):      Date G-Code Applied:  2/13/18     PT G-Codes  Functional Assessment Tool Used: UEFI  Score: 88.75%  Functional Limitation: Carrying, moving and handling objects  Carrying, Moving and Handling Objects Current Status (): At least 1 percent but less than 20 percent impaired, limited or restricted  Carrying, Moving and Handling Objects Goal Status (): 0 percent impaired, limited or restricted     Progress Note: []  Yes  [x]  No  Next due by: Visit #28  Or 13 Mar 2018    Latex Allergy:  [x]NO      []YES  Preferred Language for Healthcare:   [x]English       []other:    Visit # Insurance Allowable   12 20     Pain level: 0/10 3/6/2018      SUBJECTIVE:  Patient has no complaints today, states the shoulder is feeling good. He says he has found a good routine of doing the strengthening exercises every other day and the stretching every day. OBJECTIVE: 3/6/18    Observation:   Test measurements:      ROM PROM AROM  Comment    L R L R    Flexion  176    Abduction  187    ER  86    IR  54    Other        Other             Strength L R Comment   Flexion      Abduction      ER      IR      Supraspinatus      Upper Trap      Lower Trap      Mid Trap      Rhomboids      Biceps      Triceps      Horizontal Abduction      Horizontal Adduction      Lats          RESTRICTIONS/PRECAUTIONS: NWB, no resisted biceps for 4 weeks, 6 weeks in sling. PROM limited to 100 flexion and 60 ER for first 6 weeks.       Exercises/Interventions:   Exercise/Equipment Resistance/Repetitions Other comments   Aerobic Conditioning     Aerodyne 8'          Stretching/PROM     Wand 10x:10 ER supine At 90 ABD  scaption standing   Table Slides Wall slides  5x:10 seficits. -met  Long Term Goals: To be achieved in: 24 weeks  1. Pt will demo a UEFI of greater than or equal to 85% to assist with reaching prior level of function. -ongoing  2. Patient will demonstrate increased AROM shoulder flexion greater than or equal to 150, ABD greater than or equal to 160, IR greater than or equal 50, ER greater than or equal to 80 to allow for proper joint functioning as indicated by patient's functional deficits. -met  3. Patient will demonstrate an increase in strength to 4 to 4+ to allow for proper functional mobility as indicated by patient's functional deficits. -ongoing  4. Patient will return to work without increased symptoms or restriction. -ongoing  5. Pt will return to bowling without increasing pain or symptoms.-ongoing  6. Pt will report pain at worst less than or equal to 2/10.-ongoing    Progression Towards Functional goals:  [] Patient is progressing as expected towards functional goals listed. [] Progression is slowed due to complexities listed. [] Progression has been slowed due to co-morbidities. [x] Plan just implemented, too soon to assess goals progression  [] Other:      ASSESSMENT:      Treatment/Activity Tolerance:  [x] Patient tolerated treatment well [] Patient limited by fatigue  [] Patient limited by pain  [] Patient limited by other medical complications  [] Other:  Pt able to progress weights and repetitions. Pt still has fatigue following session. Pt required less cues for proper scapular positioning during exercises as well. Prognosis: [] Good [] Fair  [] Poor    Patient Requires Follow-up: [x] Yes  [] No    PLAN:  Pt to come to PT biweekly to progress exercises, pt feels comfortable with updated HEP. Pt to do UEFI next visit. [x] Continue per plan of care [] Alter current plan (see comments)  [] Plan of care initiated [] Hold pending MD visit [] Discharge    Electronically signed by:  Glorine Landau, Student Physical Therapist  Therapist was present, directed the patient's care, made skilled judgement, and was responsible for assessment and treatment of the patient.     Luc Stallings, DPT [id number]

## 2018-03-20 ENCOUNTER — HOSPITAL ENCOUNTER (OUTPATIENT)
Dept: PHYSICAL THERAPY | Age: 53
Discharge: HOME | End: 2018-03-21
Admitting: ORTHOPAEDIC SURGERY

## 2018-03-20 NOTE — FLOWSHEET NOTE
Upper Trap      Lower Trap      Mid Trap      Rhomboids      Biceps      Triceps      Horizontal Abduction      Horizontal Adduction      Lats          RESTRICTIONS/PRECAUTIONS: NWB, no resisted biceps for 4 weeks, 6 weeks in sling. PROM limited to 100 flexion and 60 ER for first 6 weeks.       Exercises/Interventions:   Exercise/Equipment Resistance/Repetitions Other comments   Aerobic Conditioning     Aerodyne 8'          Stretching/PROM     Wand 10x:10 ER supine At 90 ABD  scaption standing   Table Slides Wall slides  5x:10 Flex/scaption   UE [place]     Pulleys 5x:10 Flex/scap/ABD   Pendulum hangs     BB IR 10x:10 pulleys    SL IR 10x:10 On wall   Pec doorway stretch     CBA stretch 10x:10    UT stretch LS stretch Elbow flex/ext     Wrist flex/ext               Isometrics     Retraction          Weight shift     Flexion     Abduction    External Rotation     Internal Rotation     Biceps     Triceps          PRE's     Flexion     Abduction Standing scaption 3x10 2#    External Rotation     Internal Rotation     [id number] silver band    EXT     Reverse Flys     Serratus     Horizontal Abd with ER     [id number] 15#   Triceps     Retraction     Wrist flex/ext          Push up plus 3x10  wall             Cable Column/Theraband     External Rotation 3x10 blue    Internal Rotation 3x10 black    Shrugs     Lats     Ext 3x10 silver    Flex     Scapular Retraction 3x10 silver    BIC     TRIC 3x10 silver    PNF     Scap Depression  3x10 silver TB    Horizontal Scap Abduction       Dynamic Stability     Wall circles ball on wall (ABD) 30x each direction          Plyoback              Therapeutic Exercise and NMR EXR  [x] (45033) Provided verbal/tactile cueing for activities related to strengthening, flexibility, endurance, ROM  for improvements in scapular, scapulothoracic and UE control with self care, reaching, carrying, lifting, house/yardwork, driving/computer work.    [] (22378) Provided verbal/tactile cueing the exercises. Pt was advised to continue to modify activities at work and around his home. He was instructed to keep his arms below shoulder height and close to his body when completing activities. Prognosis: [] Good [] Fair  [] Poor    Patient Requires Follow-up: [x] Yes  [] No    PLAN:  Continue to progress strengthening exercises and increase rotator cuff strength per pt george. [x] Continue per plan of care [] Alter current plan (see comments)  [] Plan of care initiated [] Hold pending MD visit [] Discharge    Electronically signed by: Darrell Judge, Student Physical Therapist  Therapist was present, directed the patient's care, made skilled judgement, and was responsible for assessment and treatment of the patient.       FAUSTINO Canas [id number]

## 2018-04-01 ENCOUNTER — HOSPITAL ENCOUNTER (OUTPATIENT)
Dept: OTHER | Age: 53
Discharge: HOME | End: 2018-04-30
Attending: ORTHOPAEDIC SURGERY | Admitting: ORTHOPAEDIC SURGERY

## 2018-04-04 ENCOUNTER — HOSPITAL ENCOUNTER (OUTPATIENT)
Dept: PHYSICAL THERAPY | Age: 53
Discharge: HOME | End: 2018-04-05
Admitting: ORTHOPAEDIC SURGERY

## 2018-04-04 NOTE — FLOWSHEET NOTE
Orthopaedics and [address] [place] [address] DR JUAN CULP        Physical Therapy Daily Treatment Note  Date:  2018    Patient Name:  Mihaela Conner  \"Zia\"  :  1965  MRN: [id number]  Medical/Treatment Diagnosis Information:  · Diagnosis: S46.011 (ICD-10-CM) - Traumatic complete tear of right rotator cuff; M25.511 (ICD-10-CM) - Acute pain of right shoulder;  s/p right RCR with open biceps tenodesis. Surgery on 17  · Treatment Diagnosis: M25.511 (ICD-10-CM) - Acute pain of right shoulder  Insurance/Certification information:  PT Insurance Information: [place]  Physician Information:  Referring Practitioner: Delmi Haley of care signed (Y/N):     Date of Patient follow up with Physician: pt to schedule around 2018    G-Code (if applicable):      Date G-Code Applied:  3/20/18       PT G-Codes  Functional Assessment Tool Used: UEFI  Score: 88.75%  Functional Limitation: Carrying, moving and handling objects  Carrying, Moving and Handling Objects Current Status (): At least 1 percent but less than 20 percent impaired, limited or restricted  Carrying, Moving and Handling Objects Goal Status (): 0 percent impaired, limited or restricted       Progress Note: []  Yes  [x]  No  Next due by: Visit #28  Or 13     Latex Allergy:  [x]NO      []YES  Preferred Language for Healthcare:   [x]English       []other:    Visit # Insurance Allowable        Pain level: 0/10 2018      SUBJECTIVE:  Patient reports that his shoulder has been feeling better. He states that external rotation and shoulder scaption is still the most fatiguing exercises at home. He is not really getting any stretch from the stretches anymore unless he really pushes it.       OBJECTIVE: 18    Observation:   Test measurements:      ROM PROM AROM  Comment    L R L R    Flexion  176    Abduction     ER     IR  56    Other        Other             Strength L R Comment   Flexion      Abduction      ER      IR carrying, lifting, house/yardwork, driving/computer work.    [] (75847) Provided verbal/tactile cueing for activities related to improving balance, coordination, kinesthetic sense, posture, motor skill, proprioception  to assist with  scapular, scapulothoracic and UE control with self care, reaching, carrying, lifting, house/yardwork, driving/computer work. Therapeutic Activities:    [x] (55739 or 35051) Provided verbal/tactile cueing for activities related to improving balance, coordination, kinesthetic sense, posture, motor skill, proprioception and motor activation to allow for proper function of scapular, scapulothoracic and UE control with self care, carrying, lifting, driving/computer work. Home Exercise Program:    [x] (54993) Reviewed/Progressed HEP activities related to strengthening, flexibility, endurance, ROM of scapular, scapulothoracic and UE control with self care, reaching, carrying, lifting, house/yardwork, driving/computer work  [] (70427) Reviewed/Progressed HEP activities related to improving balance, coordination, kinesthetic sense, posture, motor skill, proprioception of scapular, scapulothoracic and UE control with self care, reaching, carrying, lifting, house/yardwork, driving/computer work      Manual Treatments:  PROM / STM / Oscillations-Mobs:  G-I, II, III, IV (PA's, Inf., Post.)  [] (39623) Provided manual therapy to mobilize soft tissue/joints of cervical/CT, scapular GHJ and UE for the purpose of modulating pain, promoting relaxation,  increasing ROM, reducing/eliminating soft tissue swelling/inflammation/restriction, improving soft tissue extensibility and allowing for proper ROM for normal function with self care, reaching, carrying, lifting, house/yardwork, driving/computer work       Pt Education:  Patient given updated home exercise program with new exercises.      Modalities: 10' ice     Charges:    Timed Code Treatment Minutes: 40'   Total Treatment Minutes: 61'     []

## 2018-04-17 ENCOUNTER — HOSPITAL ENCOUNTER (OUTPATIENT)
Dept: PHYSICAL THERAPY | Age: 53
Discharge: HOME | End: 2018-04-18
Admitting: ORTHOPAEDIC SURGERY

## 2018-04-17 NOTE — PLAN OF CARE
OBJECTIVE: 4/17/18    Observation:   Test measurements:      ROM PROM AROM  Comment    L R L R    Flexion  175    Abduction  180   ER  90   IR  57    Other        Other             Strength L R Comment   Flexion      Abduction      ER      IR      Supraspinatus      Upper Trap      Lower Trap      Mid Trap      Rhomboids      Biceps      Triceps      Horizontal Abduction      Horizontal Adduction      Lats          RESTRICTIONS/PRECAUTIONS: NWB, no resisted biceps for 4 weeks, 6 weeks in sling. PROM limited to 100 flexion and 60 ER for first 6 weeks.       Exercises/Interventions:   Exercise/Equipment Resistance/Repetitions Other comments   Aerobic Conditioning     Aerodyne          Stretching/PROM     Wand  At 90 ABD  scaption standing   Table Slides Wall slides   Flex/scaption   UE [place]     Pulleys  Flex/scap/ABD   Pendulum hangs     BB IR     SL IR  On wall   Pec doorway stretch     CBA stretch     UT stretch LS stretch Elbow flex/ext     Wrist flex/ext               Isometrics     Retraction          Weight shift     Flexion     Abduction    External Rotation     Internal Rotation     Biceps     Triceps          PRE's     Flexion     Abduction     External Rotation     Internal Rotation     Shrugs   EXT     Reverse Flys     Serratus     Horizontal Abd with ER     Biceps   Triceps      Retraction     Wrist flex/ext          Push up plus x10  From table             Cable Column/Theraband     External Rotation    Internal Rotation    Shrugs     Lats     Ext    Flex    Scapular Retraction    BIC    TRIC    PNF x10 Red bandD1 Ext/Flex  D2 Ext/Flex   Scap Depression     Horizontal Scap Abduction  Seated depression  Yoga blocks on table   Hitch hikers         Golf swings with 40oz club          Prone Y's x10    Prone T's  x10     Dynamic Stability     Wall circles ball on wall (ABD)          Plyoback              Therapeutic Exercise and NMR EXR  [x] (74889) Provided verbal/tactile cueing for activities related to strengthening, flexibility, endurance, ROM  for improvements in scapular, scapulothoracic and UE control with self care, reaching, carrying, lifting, house/yardwork, driving/computer work.    [] (24313) Provided verbal/tactile cueing for activities related to improving balance, coordination, kinesthetic sense, posture, motor skill, proprioception  to assist with  scapular, scapulothoracic and UE control with self care, reaching, carrying, lifting, house/yardwork, driving/computer work. Therapeutic Activities:    [x] (58451 or 91895) Provided verbal/tactile cueing for activities related to improving balance, coordination, kinesthetic sense, posture, motor skill, proprioception and motor activation to allow for proper function of scapular, scapulothoracic and UE control with self care, carrying, lifting, driving/computer work.      Home Exercise Program:    [x] (25181) Reviewed/Progressed HEP activities related to strengthening, flexibility, endurance, ROM of scapular, scapulothoracic and UE control with self care, reaching, carrying, lifting, house/yardwork, driving/computer work  [] (40673) Reviewed/Progressed HEP activities related to improving balance, coordination, kinesthetic sense, posture, motor skill, proprioception of scapular, scapulothoracic and UE control with self care, reaching, carrying, lifting, house/yardwork, driving/computer work      Manual Treatments:  PROM / STM / Oscillations-Mobs:  G-I, II, III, IV (PA's, Inf., Post.)  [] (17492) Provided manual therapy to mobilize soft tissue/joints of cervical/CT, scapular GHJ and UE for the purpose of modulating pain, promoting relaxation,  increasing ROM, reducing/eliminating soft tissue swelling/inflammation/restriction, improving soft tissue extensibility and allowing for proper ROM for normal function with self care, reaching, carrying, lifting, house/yardwork, driving/computer work       Pt Education:  Patient given updated home exercise tolerated treatment well [] Patient limited by fatigue  [] Patient limited by pain  [] Patient limited by other medical complications  [] Other:    Pt has been able to return to PLOF with the ability to return to play a full round of golf with minimal soreness in his shoulder. 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 He was advised to try to return to bowling slowly, playing only a couple of frames at a time while using a lightweight ball and progressing from there. Pt was given pictures of exercise progressions for PNF patterns as well as prone Y's and T's. Pt was provided with information regarding the [facility] program, if he would still desire further care. At this time, pt will no longer benefit from skilled PT and has met 6 out of his 8 goals set in the plan of care. Pt will schedule appointment with MD next week for 6 month follow up appt. Prognosis: [x] Good [] Fair  [] Poor    Patient Requires Follow-up: [x] Yes  [] No    PLAN:  Pt to f/u with MD next week. If MD agrees, pt will continue as HEP and be D/C. [] Continue per plan of care [] Alter current plan (see comments)  [] Plan of care initiated [] Hold pending MD visit [x] Discharge    Electronically signed by: Saadia Bui, Student Physical Therapist  Therapist was present, directed the patient's care, made skilled judgement, and was responsible for assessment and treatment of the patient.       Alexsandra Lee, DPT [id number]

## 2018-04-26 ENCOUNTER — OFFICE VISIT (OUTPATIENT)
Dept: ORTHOPEDIC SURGERY | Age: 53
End: 2018-04-26

## 2018-04-26 VITALS
WEIGHT: 195 LBS | HEART RATE: 77 BPM | HEIGHT: 70 IN | DIASTOLIC BLOOD PRESSURE: 80 MMHG | BODY MASS INDEX: 27.92 KG/M2 | SYSTOLIC BLOOD PRESSURE: 125 MMHG

## 2018-04-26 DIAGNOSIS — M75.21 BICEPS TENDINITIS OF RIGHT SHOULDER: Primary | ICD-10-CM

## 2018-04-26 DIAGNOSIS — S46.011D TRAUMATIC COMPLETE TEAR OF RIGHT ROTATOR CUFF, SUBSEQUENT ENCOUNTER: ICD-10-CM

## 2018-04-26 PROCEDURE — 99212 OFFICE O/P EST SF 10 MIN: CPT | Performed by: ORTHOPAEDIC SURGERY

## 2018-05-01 ENCOUNTER — HOSPITAL ENCOUNTER (OUTPATIENT)
Dept: OTHER | Age: 53
Discharge: HOME | End: 2018-05-31
Attending: ORTHOPAEDIC SURGERY | Admitting: ORTHOPAEDIC SURGERY

## 2024-09-05 ENCOUNTER — OFFICE VISIT (OUTPATIENT)
Dept: ORTHOPEDIC SURGERY | Age: 59
End: 2024-09-05

## 2024-09-05 VITALS — RESPIRATION RATE: 12 BRPM | BODY MASS INDEX: 28.63 KG/M2 | WEIGHT: 200 LBS | HEIGHT: 70 IN

## 2024-09-05 DIAGNOSIS — M75.42 CORACOID IMPINGEMENT OF LEFT SHOULDER: ICD-10-CM

## 2024-09-05 DIAGNOSIS — M25.512 ACUTE PAIN OF LEFT SHOULDER: Primary | ICD-10-CM

## 2024-09-05 RX ORDER — METHYLPREDNISOLONE ACETATE 40 MG/ML
80 INJECTION, SUSPENSION INTRA-ARTICULAR; INTRALESIONAL; INTRAMUSCULAR; SOFT TISSUE ONCE
Status: COMPLETED | OUTPATIENT
Start: 2024-09-05 | End: 2024-09-05

## 2024-09-05 RX ORDER — SILDENAFIL CITRATE 20 MG/1
TABLET ORAL
COMMUNITY
Start: 2024-08-21

## 2024-09-05 RX ADMIN — METHYLPREDNISOLONE ACETATE 80 MG: 40 INJECTION, SUSPENSION INTRA-ARTICULAR; INTRALESIONAL; INTRAMUSCULAR; SOFT TISSUE at 13:46

## 2024-09-05 RX ADMIN — Medication 4 ML: at 13:47

## 2024-09-05 SDOH — HEALTH STABILITY: PHYSICAL HEALTH: ON AVERAGE, HOW MANY DAYS PER WEEK DO YOU ENGAGE IN MODERATE TO STRENUOUS EXERCISE (LIKE A BRISK WALK)?: 1 DAY

## 2024-09-05 SDOH — HEALTH STABILITY: PHYSICAL HEALTH: ON AVERAGE, HOW MANY MINUTES DO YOU ENGAGE IN EXERCISE AT THIS LEVEL?: 40 MIN

## 2024-09-05 NOTE — PROGRESS NOTES
Suraj Fisher is seen today for his left shoulder.  He began having pain about a month ago.  He thinks it is from pulling a hose [place] he [place].  He has been having some discomfort at night.    Pain is about 3 or 4 out of 10.  He has tried Advil and some stretches.    Past medical history significant for high cholesterol.      In 2017 I did a right shoulder rotator cuff repair with decompression and bicep tenodesis.    History: Patient's relevant past family, medical, and social history are reviewed as part of today's visit. ROS of pertinent positives and negatives as above; otherwise negative.    General Exam:    Vitals: Resp. rate 12, height 1.778 m (5' 10\"), weight 90.7 kg (200 lb).  Constitutional: Patient is adequately groomed with no evidence of malnutrition  Mental Status: The patient is oriented to time, place and person.  The patient's mood and affect are appropriate.  Gait:  Patient walks with normal gait and station.  Lymphatic: The lymphatic examination bilaterally reveals all areas to be without enlargement or induration.  Vascular: Examination reveals no swelling or calf tenderness.  Peripheral pulses are palpable and 2+.  Neurological: The patient has good coordination.  There is no weakness or sensory deficit.    Skin:    Head/Neck: inspection reveals no rashes, ulcerations or lesions.  Trunk:  inspection reveals no rashes, ulcerations or lesions.  Right Lower Extremity: inspection reveals no rashes, ulcerations or lesions.  Left Lower Extremity: inspection reveals no rashes, ulcerations or lesions.  Examination of the cervical spine reveals no restriction in motion.  There are no reproduction of symptoms into either arm with flexion, extension, rotation or palpation.  The patient has a negative Spurling sign, and no tenderness.    Right shoulder has well-healed incisions but full function and normal motion with no tenderness or weakness      Left shoulder has mild pain with

## 2024-09-09 ENCOUNTER — EVALUATION (OUTPATIENT)
Dept: PHYSICAL THERAPY | Age: 59
End: 2024-09-09
Payer: COMMERCIAL

## 2024-09-09 DIAGNOSIS — M25.512 ACUTE PAIN OF LEFT SHOULDER: Primary | ICD-10-CM

## 2024-09-09 DIAGNOSIS — R53.1 WEAKNESS: ICD-10-CM

## 2024-09-09 PROCEDURE — 97110 THERAPEUTIC EXERCISES: CPT | Performed by: PHYSICAL THERAPIST

## 2024-09-09 PROCEDURE — 97530 THERAPEUTIC ACTIVITIES: CPT | Performed by: PHYSICAL THERAPIST

## 2024-09-09 PROCEDURE — 97161 PT EVAL LOW COMPLEX 20 MIN: CPT | Performed by: PHYSICAL THERAPIST

## 2025-03-18 NOTE — PROGRESS NOTES
Lino Dominique returns today 1 week out from right shoulder arthroscopy with rotator cuff repair and biceps tenodesis. He is doing well. Pain is well managed. I  removed his sutures and placed Steri-Strips. Neurologic and vascular exams right shoulder intact. He'll continue with therapy. Follow-up with me in 5 weeks. This note was created using voice recognition software. It has been proofread, but occasionally errors remain. Please disregard these errors. They will be corrected as they are noted. No